# Patient Record
Sex: MALE | Race: WHITE | Employment: OTHER | URBAN - METROPOLITAN AREA
[De-identification: names, ages, dates, MRNs, and addresses within clinical notes are randomized per-mention and may not be internally consistent; named-entity substitution may affect disease eponyms.]

---

## 2017-01-18 PROBLEM — E05.90 HYPERTHYROIDISM: Status: ACTIVE | Noted: 2017-01-18

## 2018-08-29 ENCOUNTER — ANESTHESIA EVENT (OUTPATIENT)
Dept: ENDOSCOPY | Age: 73
End: 2018-08-29
Payer: MEDICARE

## 2018-08-29 NOTE — ANESTHESIA PREPROCEDURE EVALUATION
Anesthetic History No history of anesthetic complications Review of Systems / Medical History Patient summary reviewed, nursing notes reviewed and pertinent labs reviewed Pulmonary Within defined limits Neuro/Psych Within defined limits Cardiovascular Hypertension: well controlled Hyperlipidemia GI/Hepatic/Renal 
  
GERD: well controlled Renal disease: CRI 
PUD Endo/Other Hyperthyroidism: well controlled Cancer Other Findings Physical Exam 
 
Airway Mallampati: II 
TM Distance: > 6 cm Neck ROM: normal range of motion Mouth opening: Normal 
 
 Cardiovascular Regular rate and rhythm,  S1 and S2 normal,  no murmur, click, rub, or gallop Dental 
No notable dental hx Pulmonary Breath sounds clear to auscultation Abdominal 
GI exam deferred Other Findings Anesthetic Plan ASA: 3 Anesthesia type: MAC Induction: Intravenous Anesthetic plan and risks discussed with: Patient

## 2018-08-30 ENCOUNTER — HOSPITAL ENCOUNTER (OUTPATIENT)
Age: 73
Setting detail: OUTPATIENT SURGERY
Discharge: HOME | End: 2018-08-30
Attending: INTERNAL MEDICINE | Admitting: INTERNAL MEDICINE
Payer: MEDICARE

## 2018-08-30 ENCOUNTER — ANESTHESIA (OUTPATIENT)
Dept: ENDOSCOPY | Age: 73
End: 2018-08-30
Payer: MEDICARE

## 2018-08-30 VITALS
BODY MASS INDEX: 23.72 KG/M2 | SYSTOLIC BLOOD PRESSURE: 111 MMHG | HEART RATE: 59 BPM | OXYGEN SATURATION: 98 % | DIASTOLIC BLOOD PRESSURE: 65 MMHG | TEMPERATURE: 98.3 F | WEIGHT: 147.6 LBS | RESPIRATION RATE: 14 BRPM | HEIGHT: 66 IN

## 2018-08-30 PROCEDURE — 76040000019: Performed by: INTERNAL MEDICINE

## 2018-08-30 PROCEDURE — 77030027957 HC TBNG IRR ENDOGTR BUSS -B: Performed by: INTERNAL MEDICINE

## 2018-08-30 PROCEDURE — 74011250636 HC RX REV CODE- 250/636

## 2018-08-30 PROCEDURE — 76060000031 HC ANESTHESIA FIRST 0.5 HR: Performed by: INTERNAL MEDICINE

## 2018-08-30 RX ORDER — FLUMAZENIL 0.1 MG/ML
0.2 INJECTION INTRAVENOUS
Status: DISCONTINUED | OUTPATIENT
Start: 2018-08-30 | End: 2018-08-30 | Stop reason: HOSPADM

## 2018-08-30 RX ORDER — EPINEPHRINE 0.1 MG/ML
1 INJECTION INTRACARDIAC; INTRAVENOUS
Status: DISCONTINUED | OUTPATIENT
Start: 2018-08-30 | End: 2018-08-30 | Stop reason: HOSPADM

## 2018-08-30 RX ORDER — SODIUM CHLORIDE 9 MG/ML
INJECTION, SOLUTION INTRAVENOUS
Status: DISCONTINUED | OUTPATIENT
Start: 2018-08-30 | End: 2018-08-30 | Stop reason: HOSPADM

## 2018-08-30 RX ORDER — DEXTROMETHORPHAN/PSEUDOEPHED 2.5-7.5/.8
1.2 DROPS ORAL
Status: DISCONTINUED | OUTPATIENT
Start: 2018-08-30 | End: 2018-08-30 | Stop reason: HOSPADM

## 2018-08-30 RX ORDER — PROPOFOL 10 MG/ML
INJECTION, EMULSION INTRAVENOUS AS NEEDED
Status: DISCONTINUED | OUTPATIENT
Start: 2018-08-30 | End: 2018-08-30 | Stop reason: HOSPADM

## 2018-08-30 RX ORDER — FENTANYL CITRATE 50 UG/ML
100 INJECTION, SOLUTION INTRAMUSCULAR; INTRAVENOUS
Status: DISCONTINUED | OUTPATIENT
Start: 2018-08-30 | End: 2018-08-30 | Stop reason: HOSPADM

## 2018-08-30 RX ORDER — SODIUM CHLORIDE 0.9 % (FLUSH) 0.9 %
5-10 SYRINGE (ML) INJECTION EVERY 8 HOURS
Status: DISCONTINUED | OUTPATIENT
Start: 2018-08-30 | End: 2018-08-30 | Stop reason: HOSPADM

## 2018-08-30 RX ORDER — ATROPINE SULFATE 0.1 MG/ML
0.5 INJECTION INTRAVENOUS
Status: DISCONTINUED | OUTPATIENT
Start: 2018-08-30 | End: 2018-08-30 | Stop reason: HOSPADM

## 2018-08-30 RX ORDER — SODIUM CHLORIDE 9 MG/ML
50 INJECTION, SOLUTION INTRAVENOUS CONTINUOUS
Status: DISCONTINUED | OUTPATIENT
Start: 2018-08-30 | End: 2018-08-30 | Stop reason: HOSPADM

## 2018-08-30 RX ORDER — SODIUM CHLORIDE 0.9 % (FLUSH) 0.9 %
5-10 SYRINGE (ML) INJECTION AS NEEDED
Status: DISCONTINUED | OUTPATIENT
Start: 2018-08-30 | End: 2018-08-30 | Stop reason: HOSPADM

## 2018-08-30 RX ORDER — MIDAZOLAM HYDROCHLORIDE 1 MG/ML
.25-5 INJECTION, SOLUTION INTRAMUSCULAR; INTRAVENOUS
Status: DISCONTINUED | OUTPATIENT
Start: 2018-08-30 | End: 2018-08-30 | Stop reason: HOSPADM

## 2018-08-30 RX ORDER — NALOXONE HYDROCHLORIDE 0.4 MG/ML
0.4 INJECTION, SOLUTION INTRAMUSCULAR; INTRAVENOUS; SUBCUTANEOUS
Status: DISCONTINUED | OUTPATIENT
Start: 2018-08-30 | End: 2018-08-30 | Stop reason: HOSPADM

## 2018-08-30 RX ADMIN — PROPOFOL 30 MG: 10 INJECTION, EMULSION INTRAVENOUS at 15:11

## 2018-08-30 RX ADMIN — PROPOFOL 30 MG: 10 INJECTION, EMULSION INTRAVENOUS at 15:10

## 2018-08-30 RX ADMIN — PROPOFOL 30 MG: 10 INJECTION, EMULSION INTRAVENOUS at 15:13

## 2018-08-30 RX ADMIN — PROPOFOL 40 MG: 10 INJECTION, EMULSION INTRAVENOUS at 15:06

## 2018-08-30 RX ADMIN — SODIUM CHLORIDE: 9 INJECTION, SOLUTION INTRAVENOUS at 14:55

## 2018-08-30 RX ADMIN — PROPOFOL 80 MG: 10 INJECTION, EMULSION INTRAVENOUS at 15:04

## 2018-08-30 RX ADMIN — PROPOFOL 30 MG: 10 INJECTION, EMULSION INTRAVENOUS at 15:15

## 2018-08-30 RX ADMIN — PROPOFOL 30 MG: 10 INJECTION, EMULSION INTRAVENOUS at 15:12

## 2018-08-30 RX ADMIN — PROPOFOL 30 MG: 10 INJECTION, EMULSION INTRAVENOUS at 15:09

## 2018-08-30 RX ADMIN — PROPOFOL 20 MG: 10 INJECTION, EMULSION INTRAVENOUS at 15:08

## 2018-08-30 NOTE — PROGRESS NOTES

## 2018-08-30 NOTE — H&P
[address] 
611 [facility], 9 [facility] Hodan Poe is a  68 y.o.  male who presents with no symptoms for screening . Past Medical History:  
Diagnosis Date  Achilles tendinitis on left  Allergic rhinitis  Arthritis  Blood transfusion 2006 [address], NO REACTION  
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 Renal Cell Ca.  Chronic kidney disease TUMOR REMOVAL LT. KIDNEY  
 CKD (chronic kidney disease) stage 3, GFR 30-59 ml/min 2/14/2015 S/p Partial nephrecomies for ca. Cr 1.6 in  January 2015  CML in remission ([facility] [address] 8/25/2011  GERD (gastroesophageal reflux disease)  Glaucoma 12/24/2015 2015  Hip dysplasia 10/24/2016 Left groin pain . Left Hip X Ray  : L>R hip dyplasia   
 HTN (hypertension) 8/25/2011  Hypercholesterolemia  Hypertension  Hyperthyroidism 1/18/2017  PUD (peptic ulcer disease) [address] Nasal Fracture  Trauma Concussion x 2 in his youth. 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 Past Surgical History:  
Procedure Laterality Date [address] [place] UNLISTED  ENDOSCOPY, COLON, DIAGNOSTIC    
 HX COLONOSCOPY  3/1/07 Diverticulosis. Dr. Shailesh Bocanegra . follow up in 10 years  HX ENDOSCOPY  11/9/12 reflux esophagitis , gastritis , Dr. Shailesh Bocanegra  HX GI    
 COLONOSCOPY  
 HX HEENT  11/09/2016  
 b/l functional upper Blepharoplasty for Ptosis due to Dermatochalasis  HX HERNIA REPAIR  2006  
 another hernia repair in 1975  HX ORTHOPAEDIC    
 right shoulder arthroscopy  HX OTHER SURGICAL BROKEN NOSE  
 HX PROSTATECTOMY  2006  HX UROLOGICAL    
 LT. KIDNEY TUMOR REMOVAL x2, bilateral hydrocelectomies Allergies Allergen Reactions  Morphine Other (comments) FELT LIKE HE GOT \"HOT\" AND UNCOMFORTABLE  
 Nsaids (Non-Steroidal Anti-Inflammatory Drug) Other (comments) HX OF STOMACH ULCER  Tylenol [Acetaminophen] Other (comments) INTERFERENCE WITH GLEEVEC (LEUKIMIA CML) Current Facility-Administered Medications Medication Dose Route Frequency Provider Last Rate Last Dose  
 0.9% sodium chloride infusion  50 mL/hr IntraVENous CONTINUOUS Estephanie Lindsay MD      
 sodium chloride (NS) flush 5-10 mL  5-10 mL IntraVENous Q8H Estephanie Lindsay MD      
 sodium chloride (NS) flush 5-10 mL  5-10 mL IntraVENous PRN Estephanie Lindsay MD      
 midazolam (VERSED) injection 0.25-5 mg  0.25-5 mg IntraVENous Paulino Lindsay MD      
 fentaNYL citrate (PF) injection 100 mcg  100 mcg IntraVENous Paulino Lindsay MD      
 naloxone [facility]) injection 0.4 mg  0.4 mg IntraVENous Paulino Lindsay MD      
 flumazenil (ROMAZICON) 0.1 mg/mL injection 0.2 mg  0.2 mg IntraVENous Paulino Lindsay MD      
 [facility]) 52BW/1.3WK oral drops 80 mg  1.2 mL Oral Paulino Lindsay MD      
 atropine injection 0.5 mg  0.5 mg IntraVENous ONCE PRN Estephanie Lindsay MD      
 EPINEPHrine (ADRENALIN) 0.1 mg/mL syringe 1 mg  1 mg Endoscopically ONCE PRN Estephanie Lindsay MD      
 
Facility-Administered Medications Ordered in Other Encounters Medication Dose Route Frequency Provider Last Rate Last Dose  
 0.9% sodium chloride infusion   IntraVENous CONTINUOUS Jarvis Cohen CRNA Visit Vitals  /77  Pulse 63  Temp 98.3 °F (36.8 °C)  Resp 11  
 Ht 5' 6\" (1.676 m)  Wt 67 kg (147 lb 9.6 oz)  SpO2 100%  BMI 23.82 kg/m2 PHYSICAL EXAM: 
General: WD, WN. Alert, cooperative, no acute distress   
HEENT: NC, Atraumatic. PERRLA, EOMI. Anicteric sclerae. Mallampati score 2 Lungs:  CTA Bilaterally. No Wheezing/Rhonchi/Rales. Heart:  Regular  rhythm,  No murmur (), No Rubs, No Gallops Abdomen: Soft, Non distended, Non tender.  +Bowel sounds, no HSM Extremities: No c/c/e 
 Neurologic:  CN 2-12 gi, Alert and oriented X 3. No acute neurological distress Psych:   Good insight. Not anxious nor agitated. Plan:  
Endoscopic procedure with MAC.  
 
Jun Chance MD 
8/30/2018  3:04 PM

## 2018-08-30 NOTE — DISCHARGE INSTRUCTIONS
[address]  174 [facility], [id number] [place] [address] Road  [id number]  1945    COLON DISCHARGE INSTRUCTIONS    DISCOMFORT:  Redness at IV site- apply warm compress to area; if redness or soreness persist- contact your physician  There may be a slight amount of blood passed from the rectum  Gaseous discomfort- walking, belching will help relieve any discomfort  You may not operate a vehicle for 12 hours  You may not engage in an occupation involving machinery or appliances for rest of today  You may not drink alcoholic beverages for at least 12 hours  Avoid making any critical decisions for at least 24 hour  DIET:   Regular diet. - however -  remember your colon is empty and a heavy meal will produce gas. Avoid these foods:  vegetables, fried / greasy foods, carbonated drinks for today         ACTIVITY:  You may resume your normal daily activities it is recommended that you spend the remainder of the day resting -  avoid any strenuous activity. CALL M.D. ANY SIGN OF:   Increasing pain, nausea, vomiting  Abdominal distension (swelling)  New increased bleeding (oral or rectal)  Fever (chills)  Pain in chest area  Bloody discharge from nose or mouth  Shortness of breath     Follow-up Instructions:   Call Dr. Levar Vaca for any questions or problems.    Telephone # [phone]    Should have a repeat colonoscopy in 10 years    Impression:  Diverticulosis

## 2018-08-30 NOTE — IP AVS SNAPSHOT
[address] 
[phone] Patient: Hodan Beauchamp. MRN: [id number] :1945 About your hospitalization You were admitted on:  2018 You last received care in the:  [facility] ENDOSCOPY You were discharged on:  2018 Why you were hospitalized Your primary diagnosis was:  Not on File Follow-up Information None Discharge Orders None A check priscila indicates which time of day the medication should be taken. My Medications CONTINUE taking these medications Instructions Each Dose to Equal  
 Morning Noon Evening Bedtime  
 allergy injection Your last dose was: Your next dose is:    
   
   
 by SubCUTAneous route. amLODIPine 2.5 mg tablet Commonly known as:  Wolf Mullet Your last dose was: Your next dose is:    
   
   
 take 1 tablet by mouth once daily  
     
   
   
   
  
 celecoxib 200 mg capsule Commonly known as:  CeleBREX Your last dose was: Your next dose is: Take 1 Cap by mouth daily as needed for Pain. 200 mg  
    
   
   
   
  
 CIALIS 10 mg tablet Generic drug:  tadalafil Your last dose was: Your next dose is: Take 10 mg by mouth. 10 mg  
    
   
   
   
  
 ciprofloxacin HCl 500 mg tablet Commonly known as:  CIPRO Your last dose was: Your next dose is: Take 1 Tab by mouth two (2) times a day. Take for 3 to 5 days for Traveler's Diarrhea or 10 days for a Urinary or Sinus Infection. 500 mg  
    
   
   
   
  
 codeine sulfate 30 mg tablet Your last dose was: Your next dose is: Take 1 Tab by mouth every four (4) hours as needed. 30 mg  
    
   
   
   
  
 FLONASE 50 mcg/actuation nasal spray Generic drug:  fluticasone Your last dose was: Your next dose is:    
   
   
 nightly. folic acid 1 mg tablet Commonly known as:  Google Your last dose was: Your next dose is: Take 1 Tab by mouth daily. 1 mg GLEEVEC 400 mg tablet Generic drug:  imatinib Your last dose was: Your next dose is: Take 400 mg by mouth daily. 400 mg  
    
   
   
   
  
 loperamide 2 mg tablet Commonly known as:  IMMODIUM Your last dose was: Your next dose is: Take  by mouth. 1/2 to 1 tablet up to 4 times daily as needed for diarrhea. methylphenidate HCl 36 mg CR tablet Commonly known as:  CONCERTA Your last dose was: Your next dose is: Take 1 Tab (36 mg total) by mouth every morningEarliest Fill Date: 8/15/18. Max Daily Amount: 36 mg  
 36 mg  
    
   
   
   
  
 metoprolol tartrate 25 mg tablet Commonly known as:  LOPRESSOR Your last dose was: Your next dose is:    
   
   
 take 1 tablet by mouth twice a day * OTHER Your last dose was: Your next dose is:    
   
   
 Glaucoma : one drop OU daily . Dr. Natalie Cassidy 's group * OTHER Your last dose was: Your next dose is:    
   
   
 Omega 6: 4- 5 tabs daily * OTHER Your last dose was: Your next dose is:    
   
   
 Saline Nasal Spray prn  
     
   
   
   
  
 * OTHER Your last dose was: Your next dose is:    
   
   
 Eye drop for eye pressure each night -Dr. Leelee Swan group * OTHER Your last dose was: Your next dose is:    
   
   
 Melatonin 5 mg and Zinc . One qhs  
     
   
   
   
  
 * PANTOPRAZOLE PO Your last dose was: Your next dose is: Take 40 mg by mouth daily.   
 40 mg  
    
   
   
   
  
 * pantoprazole 40 mg tablet Commonly known as:  PROTONIX Your last dose was: Your next dose is:    
   
   
 take 1 tablet by mouth twice a day  
     
   
   
   
  
 pravastatin 10 mg tablet Commonly known as:  PRAVACHOL Your last dose was: Your next dose is:    
   
   
 take 1 tablet by mouth EVERY MONDAY, WEDNESDAY AND FRIDAY  
     
   
   
   
  
 zolpidem 10 mg tablet Commonly known as:  AMBIEN Your last dose was: Your next dose is: Take 0.5 Tabs by mouth nightly as needed. Max Daily Amount: 5 mg.  
 5 mg * Notice: This list has 7 medication(s) that are the same as other medications prescribed for you. Read the directions carefully, and ask your doctor or other care provider to review them with you. Opioid Education Prescription Opioids: What You Need to Know: 
 
Prescription opioids can be used to help relieve moderate-to-severe pain and are often prescribed following a surgery or injury, or for certain health conditions. These medications can be an important part of treatment but also come with serious risks. Opioids are strong pain medicines. Examples include hydrocodone, oxycodone, fentanyl, and morphine. Heroin is an example of an illegal opioid. It is important to work with your health care provider to make sure you are getting the safest, most effective care. WHAT ARE THE RISKS AND SIDE EFFECTS OF OPIOID USE? Prescription opioids carry serious risks of addiction and overdose, especially with prolonged use. An opioid overdose, often marked by slow breathing, can cause sudden death. The use of prescription opioids can have a number of side effects as well, even when taken as directed. · Tolerance-meaning you might need to take more of a medication for the same pain relief · Physical dependence-meaning you have symptoms of withdrawal when the medication is stopped. Withdrawal symptoms can include nausea, sweating, chills, diarrhea, stomach cramps, and muscle aches. Withdrawal can last up to several weeks, depending on which drug you took and how long you took it. · Increased sensitivity to pain · Constipation · Nausea, vomiting, and dry mouth · Sleepiness and dizziness · Confusion · Depression · Low levels of testosterone that can result in lower sex drive, energy, and strength · Itching and sweating RISKS ARE GREATER WITH:      
· History of drug misuse, substance use disorder, or overdose · Mental health conditions (such as depression or anxiety) · Sleep apnea · Older age (72 years or older) · Pregnancy Avoid alcohol while taking prescription opioids. Also, unless specifically advised by your health care provider, medications to avoid include: · Benzodiazepines (such as Xanax or Valium) · Muscle relaxants (such as Soma or Flexeril) · Hypnotics (such as Ambien or Lunesta) · Other prescription opioids KNOW YOUR OPTIONS Talk to your health care provider about ways to manage your pain that don't involve prescription opioids. Some of these options may actually work better and have fewer risks and side effects. Options may include: 
· Pain relievers such as acetaminophen, ibuprofen, and naproxen · Some medications that are also used for depression or seizures · Physical therapy and exercise · Counseling to help patients learn how to cope better with triggers of pain and stress. · Application of heat or cold compress · Massage therapy · Relaxation techniques Be Informed Make sure you know the name of your medication, how much and how often to take it, and its potential risks & side effects. IF YOU ARE PRESCRIBED OPIOIDS FOR PAIN: 
· Never take opioids in greater amounts or more often than prescribed. Remember the goal is not to be pain-free but to manage your pain at a tolerable level. · Follow up with your primary care provider to: · Work together to create a plan on how to manage your pain. · Talk about ways to help manage your pain that don't involve prescription opioids. · Talk about any and all concerns and side effects. · Help prevent misuse and abuse. · Never sell or share prescription opioids · Help prevent misuse and abuse. · Store prescription opioids in a secure place and out of reach of others (this may include visitors, children, friends, and family). · Safely dispose of unused/unwanted prescription opioids: Find your community drug take-back program or your pharmacy mail-back program, or flush them down the toilet, following guidance from the Food and Drug Administration (www.fda.gov/Drugs/ResourcesForYou). · Visit www.cdc.gov/drugoverdose to learn about the risks of opioid abuse and overdose. · If you believe you may be struggling with addiction, tell your health care provider and ask for guidance or call [facility] at [phone]. Discharge Instructions [address] [place] [address] [facility]. 
[id number] 
1945 COLON DISCHARGE INSTRUCTIONS DISCOMFORT: 
Redness at IV site- apply warm compress to area; if redness or soreness persist- contact your physician There may be a slight amount of blood passed from the rectum Gaseous discomfort- walking, belching will help relieve any discomfort You may not operate a vehicle for 12 hours You may not engage in an occupation involving machinery or appliances for rest of today You may not drink alcoholic beverages for at least 12 hours Avoid making any critical decisions for at least 24 hour DIET: 
 Regular diet.  however -  remember your colon is empty and a heavy meal will produce gas. Avoid these foods:  vegetables, fried / greasy foods, carbonated drinks for today ACTIVITY: 
You may resume your normal daily activities it is recommended that you spend the remainder of the day resting -  avoid any strenuous activity. CALL M.D. ANY SIGN OF: Increasing pain, nausea, vomiting Abdominal distension (swelling) New increased bleeding (oral or rectal) Fever (chills) Pain in chest area Bloody discharge from nose or mouth Shortness of breath Follow-up Instructions: 
 Call Dr. Vickie Spears for any questions or problems. Telephone # [phone] Should have a repeat colonoscopy in 10 years Impression:  Diverticulosis Introducing \[facility] [place] [facility]\"" & HEALTH SERVICES! [facility] introduces [employer] patient portal. Now you can access parts of your medical record, email your doctor's office, and request medication refills online. 1. In your internet browser, go to [email or web address] 2. Click on the First Time User? Click Here link in the Sign In box. You will see the New Member Sign Up page. 3. Enter your [employer] Access Code exactly as it appears below. You will not need to use this code after youve completed the sign-up process. If you do not sign up before the expiration date, you must request a new code. · [employer] Access Code: [email or web address] Expires: 11/28/2018  1:17 PM 
 
4. Enter the last four digits of your Social Security Number (xxxx) and Date of Birth (mm/dd/yyyy) as indicated and click Submit. You will be taken to the next sign-up page. 5. Create a [employer] Expresst ID. This will be your [employer] login ID and cannot be changed, so think of one that is secure and easy to remember. 6. Create a [employer] password. You can change your password at any time. 7. Enter your Password Reset Question and Answer. This can be used at a later time if you forget your password. 8. Enter your e-mail address.  You will receive e-mail notification when new information is available in [employer]. 9. Click Sign Up. You can now view and download portions of your medical record. 10. Click the Download Summary menu link to download a portable copy of your medical information. If you have questions, please visit the Frequently Asked Questions section of the [employer] website. Remember, [employer] is NOT to be used for urgent needs. For medical emergencies, dial 911. Now available from your iPhone and Android! Introducing Jose Rivero As a [facility]andra Gue[facility]a patient, I wanted to make you aware of our electronic visit tool called Jose Rivero. [facility] 24/7 allows you to connect within minutes with a medical provider 24 hours a day, seven days a week via a mobile device or tablet or logging into a secure website from your computer. You can access Jose Hoganfainafin from anywhere in the [place]. A virtual visit might be right for you when you have a simple condition and feel like you just dont want to get out of bed, or cant get away from work for an appointment, when your regular [facility]andra Guevara provider is not available (evenings, weekends or holidays), or when youre out of town and need minor care. Electronic visits cost only $49 and if the [facility]andra Guevara 24/7 provider determines a prescription is needed to treat your condition, one can be electronically transmitted to a nearby pharmacy*. Please take a moment to enroll today if you have not already done so. The enrollment process is free and takes just a few minutes. To enroll, please download the Casandra Paola 24/7 jose to your tablet or phone, or visit [email or web address] to enroll on your computer. And, as an [address] [place] [address] patient with a [employer] account, the results of your visits will be scanned into your electronic medical record and your primary care provider will be able to view the scanned results. We urge you to continue to see your regular Bhakti Sanchez provider for your ongoing medical care. And while your primary care provider may not be the one available when you seek a Jose Loweryfin virtual visit, the peace of mind you get from getting a real diagnosis real time can be priceless. For more information on Jose Loweryfin, view our Frequently Asked Questions (FAQs) at [email or web address]. Sincerely, 
 
Sadia Jimenez MD 
Chief Medical Officer [employer] *:  certain medications cannot be prescribed via Jose Edisonfainafin Providers Seen During Your Hospitalization Provider Specialty Primary office phone Luzma Rae MD Gastroenterology [phone] Your Primary Care Physician (PCP) Primary Care Physician Office Phone Office Fax [address], [facility] [phone] You are allergic to the following Allergen Reactions Morphine Other (comments) FELT LIKE HE GOT \"HOT\" AND UNCOMFORTABLE Nsaids (Non-Steroidal Anti-Inflammatory Drug) Other (comments) HX OF STOMACH ULCER Tylenol (Acetaminophen) Other (comments) INTERFERENCE WITH GLEEVEC (LEUKIMIA CML) Recent Documentation Height Weight BMI Smoking Status 1.676 m 67 kg 23.82 kg/m2 Former Smoker Emergency Contacts Name Discharge Info Relation Home Work Mobile ManoharDenice RUFINA DISCHARGE CAREGIVER [3] Spouse [3] [phone] Patient Belongings The following personal items are in your possession at time of discharge: 
  Dental Appliances: None  Visual Aid: Glasses, At bedside Please provide this summary of care documentation to your next provider. Signatures-by signing, you are acknowledging that this After Visit Summary has been reviewed with you and you have received a copy.   
  
 
  
    
    
 Patient Signature: ____________________________________________________________ Date:  ____________________________________________________________  
  
Loren [place] Provider Signature:  ____________________________________________________________ Date:  ____________________________________________________________

## 2018-08-30 NOTE — ANESTHESIA POSTPROCEDURE EVALUATION
Post-Anesthesia Evaluation and Assessment Patient: Fabiana Washington MRN: [id number]  SSN: [id number] YOB: 1945  Age: 68 y.o. Sex: male Cardiovascular Function/Vital Signs Visit Vitals  /65  Pulse (!) 59  Temp 36.8 °C (98.3 °F)  Resp 14  
 Ht 5' 6\" (1.676 m)  Wt 67 kg (147 lb 9.6 oz)  SpO2 98%  BMI 23.82 kg/m2 Patient is status post MAC anesthesia for Procedure(s): 
COLONOSCOPY. Nausea/Vomiting: None Postoperative hydration reviewed and adequate. Pain: 
Pain Scale 1: Visual (08/30/18 1533) Pain Intensity 1: 0 (08/30/18 1533) Managed Neurological Status: At baseline Mental Status and Level of Consciousness: Arousable Pulmonary Status:  
O2 Device: Room air (08/30/18 1530) Adequate oxygenation and airway patent Complications related to anesthesia: None Post-anesthesia assessment completed. No concerns Signed By: Dev Mullen MD   
 August 30, 2018 04-Sep-2017

## 2018-08-30 NOTE — PROCEDURES
[address], [address] [place] [address]         Procedure:  Colonoscopy    :  Shahida Betts MD    Referring Provider: Vitaly Eduardo IV, MD    Sedation:  MAC anesthesia Propofol      Prior to the procedure its objectives, risks, consequences and alternatives were discussed with the patient who then elected to proceed. The patient had the opportunity to ask questions and those questions were answered. A physical exam was performed. The heart, lungs, and mental status were examined prior to the procedure and found to be satisfactory for conscious sedation and for the procedure. Conscious sedation was initiated by the physician. Continuous pulse oximetry and blood pressure monitoring were used throughout the procedure. After appropriate analgesia and rectal exam, the colonoscope was passed into the anus and passed to the cecum without difficulty. The prep was good. On slow withdrawal of the scope, the cecum, ascending, colon, hepatic flexure, transverse colon, splenic flexure and descending colon were normal. In the sigmoid there were mild diverticulosis. The rectum was normal. Retroflexion exam of the rectum was normal He tolerated the procedure without complication and I recommend a repeat colonoscopy in 10 years. Specimen Removed:  None    Complications: None. EBL:  None.         Shahida Betts MD  8/30/2018  3:18 PM

## 2021-10-27 ENCOUNTER — HOSPITAL ENCOUNTER (OUTPATIENT)
Dept: MRI IMAGING | Age: 76
Discharge: HOME | End: 2021-10-27
Attending: INTERNAL MEDICINE
Payer: MEDICARE

## 2021-10-27 DIAGNOSIS — G89.29 CHRONIC RIGHT-SIDED LOW BACK PAIN WITHOUT SCIATICA: ICD-10-CM

## 2021-10-27 DIAGNOSIS — M54.50 CHRONIC RIGHT-SIDED LOW BACK PAIN WITHOUT SCIATICA: ICD-10-CM

## 2021-10-27 PROCEDURE — 72148 MRI LUMBAR SPINE W/O DYE: CPT

## 2022-03-19 PROBLEM — E05.90 HYPERTHYROIDISM: Status: ACTIVE | Noted: 2017-01-18

## 2022-06-16 PROBLEM — N18.30 CHRONIC RENAL DISEASE, STAGE III (HCC): Status: ACTIVE | Noted: 2022-06-16

## 2022-10-25 ENCOUNTER — OFFICE VISIT (OUTPATIENT)
Dept: SURGERY | Age: 77
End: 2022-10-25
Payer: MEDICARE

## 2022-10-25 VITALS
HEART RATE: 85 BPM | WEIGHT: 148.5 LBS | DIASTOLIC BLOOD PRESSURE: 80 MMHG | OXYGEN SATURATION: 97 % | SYSTOLIC BLOOD PRESSURE: 130 MMHG | BODY MASS INDEX: 22.51 KG/M2 | HEIGHT: 68 IN | TEMPERATURE: 97.9 F | RESPIRATION RATE: 20 BRPM

## 2022-10-25 DIAGNOSIS — K40.90 LEFT INGUINAL HERNIA: Primary | ICD-10-CM

## 2022-10-25 PROCEDURE — 3078F DIAST BP <80 MM HG: CPT | Performed by: SURGERY

## 2022-10-25 PROCEDURE — G8754 DIAS BP LESS 90: HCPCS | Performed by: SURGERY

## 2022-10-25 PROCEDURE — G8752 SYS BP LESS 140: HCPCS | Performed by: SURGERY

## 2022-10-25 PROCEDURE — 1123F ACP DISCUSS/DSCN MKR DOCD: CPT | Performed by: SURGERY

## 2022-10-25 PROCEDURE — 3074F SYST BP LT 130 MM HG: CPT | Performed by: SURGERY

## 2022-10-25 PROCEDURE — G8536 NO DOC ELDER MAL SCRN: HCPCS | Performed by: SURGERY

## 2022-10-25 PROCEDURE — 99203 OFFICE O/P NEW LOW 30 MIN: CPT | Performed by: SURGERY

## 2022-10-25 PROCEDURE — G8510 SCR DEP NEG, NO PLAN REQD: HCPCS | Performed by: SURGERY

## 2022-10-25 PROCEDURE — G8427 DOCREV CUR MEDS BY ELIG CLIN: HCPCS | Performed by: SURGERY

## 2022-10-25 PROCEDURE — G8420 CALC BMI NORM PARAMETERS: HCPCS | Performed by: SURGERY

## 2022-10-25 PROCEDURE — 1101F PT FALLS ASSESS-DOCD LE1/YR: CPT | Performed by: SURGERY

## 2022-10-26 NOTE — PROGRESS NOTES
Surgery Consult    Subjective: Tucker Levy is a 69 y.o.  male who was referred for evaluation of left groin lump. Symptoms were first noted several years ago. Symptoms did not start at work. Pain is dull, intermittent. Lump is reducible. Patient does not have symptoms of chronic constipation, chronic cough, difficulty urinating. Patient has a history of previous hernia surgery (open right inguinal hernia repair x2); of note, repair of recurrent hernia performed in setting of open prostatectomy in 2007. He denies nausea, vomiting, fever, chills, dysuria, exertional chest pain, or wheezing. Patient Active Problem List    Diagnosis Date Noted    Left inguinal hernia 10/25/2022    Chronic renal disease, stage III 06/16/2022    Hyperthyroidism 01/18/2017    Hip dysplasia 11/15/2016    Hypercholesterolemia     Glaucoma 12/24/2015    CKD (chronic kidney disease) stage 3, GFR 30-59 ml/min ([facility]) 12/24/2015    CKD (chronic kidney disease) 02/14/2015    ADHD (attention deficit hyperactivity disorder) 02/14/2015    Hyperlipidemia 07/09/2013    Vitamin D deficiency 07/09/2013    Achilles tendinitis on left 10/06/2012    H/O prostate cancer 10/06/2012    HTN (hypertension) 08/25/2011    CML in remission ([facility] [address] 08/25/2011    Renal cell cancer ([facility] [address] 08/25/2011    Shoulder impingement syndrome 02/01/2011    Acromioclavicular joint arthritis 02/01/2011     Past Medical History:   Diagnosis Date    Achilles tendinitis on left     Allergic rhinitis     Arthritis     Blood transfusion 2006    [facility]. [address], NO REACTION    Calculus of kidney     Cancer (HCC)     CML, Prostate Ca. Renal Cell Ca. Chronic kidney disease     TUMOR REMOVAL LT. KIDNEY    CKD (chronic kidney disease) stage 3, GFR 30-59 ml/min ([facility]) 02/14/2015    S/p Partial nephrecomies for ca.  Cr 1.6 in  January 2015    CML in remission [facility]) 08/25/2011    COVID     late Sep 2022    DDD (degenerative disc disease), lumbar     10/26/21: Lumbar MRI : multilevel degenerative change, most significant at L2-L3 and L4-L5     GERD (gastroesophageal reflux disease)     Glaucoma 12/24/2015    2015     Hip dysplasia 10/24/2016    Left groin pain . Left Hip X Ray  : L>R hip dyplasia     HTN (hypertension) 08/25/2011    Hypercholesterolemia     Hypertension     Hyperthyroidism 01/18/2017    PUD (peptic ulcer disease)     Trauma 1967    Nasal Fracture    Trauma     Concussion x 2 in his youth. 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 Zoster      Past Surgical History:   Procedure Laterality Date    COLONOSCOPY N/A 8/30/2018    COLONOSCOPY performed by Justice Toro MD at [facility] ENDOSCOPY. + Diverticulosis     ENDOSCOPY, COLON, DIAGNOSTIC      HX COLONOSCOPY  3/1/07    Diverticulosis. Dr. Janelle Hudson . follow up in 10 years    HX ENDOSCOPY  11/9/12    reflux esophagitis , gastritis , Dr. Keiko Hernandez GI      COLONOSCOPY    HX HEENT  11/09/2016    b/l functional upper Blepharoplasty for Ptosis due to Dermatochalasis    HX HEENT      cataract surgery     HX HERNIA REPAIR  2006    another hernia repair in [address] [place] [address]      right shoulder arthroscopy    HX OTHER SURGICAL      BROKEN NOSE    HX PROSTATECTOMY  2006    HX UROLOGICAL      LT. KIDNEY TUMOR REMOVAL x2, bilateral hydrocelectomies     MS ABDOMEN SURGERY PROC UNLISTED        Family History   Problem Relation Age of Onset    Dementia Mother         Ivana Bismark    Hypertension Mother     Heart Attack Father 55    Hypertension Father     Cancer Brother 62        Non -HODGKINS LYMPHOMA    Stroke Maternal Grandmother     Diabetes Paternal Grandmother     Other Sister 61        Polycythemia.  unclear details       Social History     Tobacco Use    Smoking status: Former     Types: Cigars    Smokeless tobacco: Never    Tobacco comments:     CIGARS FOR 5 YEARS   Substance Use Topics    Alcohol use: Yes     Comment: 10 to 12 drinks per week       Current Outpatient Medications   Medication Sig nirmatrelvir-ritonavir (PAXLOVID) 300 mg (150 mg x 2)-100 mg 3 pills by mouth twice daily for  5 days    pravastatin (PRAVACHOL) 10 mg tablet TAKE 1 TABLET BY MOUTH EVERY MONDAY, WEDNESDAY AND FRIDAY    zolpidem (AMBIEN) 10 mg tablet TAKE 1/2 TABLET AT BEDTIME AS NEEDED    amLODIPine (NORVASC) 5 mg tablet TAKE 1 TABLET BY MOUTH ONCE DAILY    metoprolol tartrate (LOPRESSOR) 25 mg tablet TAKE 1 TABLET BY MOUTH TWICE DAILY    pantoprazole (PROTONIX) 40 mg tablet TAKE 1 TABLET BY MOUTH ONCE DAILY    penciclovir (DENAVIR) 1 % topical cream Apply  to affected area every two (2) hours. magnesium 250 mg tab Take  by mouth daily as needed (leg cramps). codeine sulfate 30 mg tablet Take one tablet q 6 hrs,prn    loperamide (IMMODIUM) 2 mg tablet Take  by mouth. 1/2 to 1 tablet up to 4 times daily as needed for diarrhea. OTHER Melatonin 5 mg and Zinc . One qhs    OTHER Omega 6: 4- 5 tabs daily. Pre biotic 1-2 tabs daily    OTHER Saline Nasal Spray prn    OTHER Eye drop for eye pressure each night -Dr. Medina [facility] group      OTHER Glaucoma : one drop OU daily . Dr. Theresa Murray 's group    fluticasone propionate (FLONASE) 50 mcg/actuation nasal spray nightly. allergy injection by SubCUTAneous route. imatinib (GLEEVEC) 400 mg tablet Take 400 mg by mouth daily. methylphenidate ER 36 mg 24 hr tab Take one pill daily    predniSONE (DELTASONE) 10 mg tablet Take 10 mg by mouth daily as needed for Pain. (Patient not taking: Reported on 10/25/2022)    azithromycin (ZITHROMAX) 250 mg tablet 2 pills day 1. Then 1 pill daily (Patient not taking: Reported on 10/25/2022)    ciprofloxacin HCl (CIPRO) 500 mg tablet Take 1 Tab by mouth two (2) times a day. Take for 3 to 5 days for Traveler's Diarrhea or 10 days for a Urinary or Sinus Infection. (Patient not taking: Reported on 10/25/2022)    tadalafiL (CIALIS) 10 mg tablet Take 10 mg by mouth. No current facility-administered medications for this visit.       Allergies Allergen Reactions    Morphine Other (comments)     FELT LIKE HE GOT \"HOT\" AND UNCOMFORTABLE    Nsaids (Non-Steroidal Anti-Inflammatory Drug) Other (comments)     HX OF STOMACH ULCER    Tylenol [Acetaminophen] Other (comments)     INTERFERENCE WITH GLEEVEC (LEUKIMIA CML)        Review of Systems:  A complete review of systems was negative except as noted in the HPI. Objective:     Visit Vitals  /80 (BP 1 Location: Left upper arm, BP Patient Position: Sitting, BP Cuff Size: Large adult)   Pulse 85   Temp 97.9 °F (36.6 °C)   Resp 20   Ht 5' 8\" (1.727 m)   Wt 148 lb 8 oz (67.4 kg)   SpO2 97%   BMI 22.58 kg/m²       Physical Exam:  GENERAL: alert, cooperative, no distress, appears stated age, EYE: negative, LYMPHATIC: No cervical or supraclavicular adenopathy. THROAT & NECK: normal, LUNG: clear to auscultation bilaterally, HEART: regular rate and rhythm, S1, S2 normal, no murmur. ABDOMEN: NABS, nondistended, soft. No ventral hernia. Tender, reducible left inguinal hernia. No right inguinal hernia. EXTREMITIES:  extremities normal, atraumatic, no cyanosis or edema, SKIN: Normal., NEUROLOGIC: negative    Assessment:     Left inguinal hernia, reducible, but tender to palpation. In the setting of prior open prostatectomy, I recommended open left inguinal hernia repair with mesh as safest option. Technical aspects of procedure reviewed along with risks (to include but not limited to bleeding, wound infection, recurrence, groin pain). Also reviewed the outpatient nature of the procedure, postoperative activity restrictions, and expected results. He desires to proceed with this procedure later this year. Plan:     1. We will proceed with scheduling for open left inguinal hernia repair with mesh. 2.  Avoid constipation; recommended addition of MiraLAX to daily medications. 3.  Avoid strenuous activity; physical findings for incarceration reviewed with patient.

## 2022-11-03 PROBLEM — M51.9 LUMBAR DISC DISEASE: Status: ACTIVE | Noted: 2022-11-03

## 2022-12-16 ENCOUNTER — HOSPITAL ENCOUNTER (OUTPATIENT)
Dept: PREADMISSION TESTING | Age: 77
End: 2022-12-16
Payer: MEDICARE

## 2022-12-16 ENCOUNTER — HOSPITAL ENCOUNTER (OUTPATIENT)
Dept: GENERAL RADIOLOGY | Age: 77
Discharge: HOME | End: 2022-12-16
Attending: SURGERY
Payer: MEDICARE

## 2022-12-16 VITALS
SYSTOLIC BLOOD PRESSURE: 161 MMHG | WEIGHT: 147.05 LBS | DIASTOLIC BLOOD PRESSURE: 73 MMHG | BODY MASS INDEX: 23.63 KG/M2 | HEIGHT: 66 IN | OXYGEN SATURATION: 98 % | HEART RATE: 56 BPM | TEMPERATURE: 97.9 F

## 2022-12-16 LAB
ALBUMIN SERPL-MCNC: 3.4 G/DL (ref 3.5–5)
ALBUMIN/GLOB SERPL: 1.2 {RATIO} (ref 1.1–2.2)
ALP SERPL-CCNC: 58 U/L (ref 45–117)
ALT SERPL-CCNC: 20 U/L (ref 12–78)
ANION GAP SERPL CALC-SCNC: 4 MMOL/L (ref 5–15)
APPEARANCE UR: CLEAR
AST SERPL-CCNC: 24 U/L (ref 15–37)
BACTERIA URNS QL MICRO: NEGATIVE /HPF
BASOPHILS # BLD: 0.1 K/UL (ref 0–0.1)
BASOPHILS NFR BLD: 1 % (ref 0–1)
BILIRUB SERPL-MCNC: 0.3 MG/DL (ref 0.2–1)
BILIRUB UR QL: NEGATIVE
BUN SERPL-MCNC: 21 MG/DL (ref 6–20)
BUN/CREAT SERPL: 13 (ref 12–20)
CALCIUM SERPL-MCNC: 8.7 MG/DL (ref 8.5–10.1)
CHLORIDE SERPL-SCNC: 103 MMOL/L (ref 97–108)
CO2 SERPL-SCNC: 28 MMOL/L (ref 21–32)
COLOR UR: NORMAL
CREAT SERPL-MCNC: 1.58 MG/DL (ref 0.7–1.3)
DIFFERENTIAL METHOD BLD: ABNORMAL
EOSINOPHIL # BLD: 0.2 K/UL (ref 0–0.4)
EOSINOPHIL NFR BLD: 4 % (ref 0–7)
EPITH CASTS URNS QL MICRO: NORMAL /LPF
ERYTHROCYTE [DISTWIDTH] IN BLOOD BY AUTOMATED COUNT: 14.4 % (ref 11.5–14.5)
GLOBULIN SER CALC-MCNC: 2.9 G/DL (ref 2–4)
GLUCOSE SERPL-MCNC: 110 MG/DL (ref 65–100)
GLUCOSE UR STRIP.AUTO-MCNC: NEGATIVE MG/DL
HCT VFR BLD AUTO: 33.8 % (ref 36.6–50.3)
HGB BLD-MCNC: 11.5 G/DL (ref 12.1–17)
HGB UR QL STRIP: NEGATIVE
HYALINE CASTS URNS QL MICRO: NORMAL /LPF (ref 0–5)
IMM GRANULOCYTES # BLD AUTO: 0 K/UL (ref 0–0.04)
IMM GRANULOCYTES NFR BLD AUTO: 1 % (ref 0–0.5)
KETONES UR QL STRIP.AUTO: NEGATIVE MG/DL
LEUKOCYTE ESTERASE UR QL STRIP.AUTO: NEGATIVE
LYMPHOCYTES # BLD: 1 K/UL (ref 0.8–3.5)
LYMPHOCYTES NFR BLD: 24 % (ref 12–49)
MAGNESIUM SERPL-MCNC: 2.1 MG/DL (ref 1.6–2.4)
MCH RBC QN AUTO: 31.7 PG (ref 26–34)
MCHC RBC AUTO-ENTMCNC: 34 G/DL (ref 30–36.5)
MCV RBC AUTO: 93.1 FL (ref 80–99)
MONOCYTES # BLD: 0.4 K/UL (ref 0–1)
MONOCYTES NFR BLD: 10 % (ref 5–13)
NEUTS SEG # BLD: 2.6 K/UL (ref 1.8–8)
NEUTS SEG NFR BLD: 60 % (ref 32–75)
NITRITE UR QL STRIP.AUTO: NEGATIVE
NRBC # BLD: 0 K/UL (ref 0–0.01)
NRBC BLD-RTO: 0 PER 100 WBC
PH UR STRIP: 7 [PH] (ref 5–8)
PLATELET # BLD AUTO: 203 K/UL (ref 150–400)
PMV BLD AUTO: 11.2 FL (ref 8.9–12.9)
POTASSIUM SERPL-SCNC: 4.7 MMOL/L (ref 3.5–5.1)
PROT SERPL-MCNC: 6.3 G/DL (ref 6.4–8.2)
PROT UR STRIP-MCNC: NEGATIVE MG/DL
RBC # BLD AUTO: 3.63 M/UL (ref 4.1–5.7)
RBC #/AREA URNS HPF: NORMAL /HPF (ref 0–5)
SODIUM SERPL-SCNC: 135 MMOL/L (ref 136–145)
SP GR UR REFRACTOMETRY: 1.01 (ref 1–1.03)
UA: UC IF INDICATED,UAUC: NORMAL
UROBILINOGEN UR QL STRIP.AUTO: 0.2 EU/DL (ref 0.2–1)
WBC # BLD AUTO: 4.2 K/UL (ref 4.1–11.1)
WBC URNS QL MICRO: NORMAL /HPF (ref 0–4)

## 2022-12-16 PROCEDURE — 80053 COMPREHEN METABOLIC PANEL: CPT

## 2022-12-16 PROCEDURE — 36415 COLL VENOUS BLD VENIPUNCTURE: CPT

## 2022-12-16 PROCEDURE — 85025 COMPLETE CBC W/AUTO DIFF WBC: CPT

## 2022-12-16 PROCEDURE — 71046 X-RAY EXAM CHEST 2 VIEWS: CPT

## 2022-12-16 PROCEDURE — 83735 ASSAY OF MAGNESIUM: CPT

## 2022-12-16 PROCEDURE — 81001 URINALYSIS AUTO W/SCOPE: CPT

## 2022-12-16 RX ORDER — MELATONIN
1000 DAILY
COMMUNITY

## 2022-12-16 RX ORDER — CODEINE SULFATE 15 MG/1
TABLET ORAL
COMMUNITY

## 2022-12-16 NOTE — PERIOP NOTES
[address] INSTRUCTIONS    Surgery Date:   1/5/23    Your surgeon's office or [facility] staff will call you between 4 PM- 8 PM the day before surgery with your arrival time. If your surgery is on a Monday, you will receive a call the preceding Friday. Please report to [facility] Patient Access/Admitting on the 1st floor. Bring your insurance card, photo identification, and any copayment ( if applicable). If you are going home the same day of your surgery, you must have a responsible adult to drive you home. You need to have a responsible adult to stay with you the first 24 hours after surgery and you should not drive a car for 24 hours following your surgery. Do NOT eat any solid foods after midnight the night before surgery including candy, mint or gum. You may drink clear liquids from midnight until 1 hour prior to your arrival. You may drink up to 12 ounces at one time every 4 hours. Please note special instructions, if applicable, below for medications. Do NOT drink alcohol or smoke 24 hours before surgery. STOP smoking for 14 days prior as it helps with breathing and healing after surgery. If you are being admitted to the hospital, please leave personal belongings/luggage in your car until you have an assigned hospital room number. Please wear comfortable clothes. Wear your glasses instead of contacts. We ask that all money, jewelry and valuables be left at home. Wear no make up, particularly mascara, the day of surgery. All body piercings, rings, and jewelry need to be removed and left at home. Please remove any nail polish or artifical nails from your fingernails. Please wear your hair loose or down. Please no pony-tails, buns, or any metal hair accessories. If you shower the morning of surgery, please do not apply any lotions or powders afterwards. You may wear deodorant, unless having breast surgery. Do not shave any body area within 24 hours of your surgery.   Please follow all instructions to avoid any potential surgical cancellation. Should your physical condition change, (i.e. fever, cold, flu, etc.) please notify your surgeon as soon as possible. It is important to be on time. If a situation occurs where you may be delayed, please call:  [phone] / [phone] on the day of surgery. The Preadmission Testing staff can be reached at [phone]. Special instructions: NONE      Current Outpatient Medications   Medication Sig    cholecalciferol (Vitamin D3) (1000 Units /25 mcg) tablet Take 1,000 Units by mouth daily. L.acid,casei,plant,saliv/B.ani (PROBIOTIC FORMULA PO) Take  by mouth.    codeine sulfate 15 mg tablet Take  by mouth every four (4) hours as needed for Pain. UNSURE OF DOSE    methylphenidate ER 36 mg 24 hr tab Take one pill daily    metoprolol tartrate (LOPRESSOR) 25 mg tablet TAKE 1 TABLET BY MOUTH TWICE A DAY    zolpidem (AMBIEN) 10 mg tablet TAKE 1/2 TABLET AT BEDTIME AS NEEDED    pravastatin (PRAVACHOL) 10 mg tablet TAKE 1 TABLET BY MOUTH EVERY MONDAY, WEDNESDAY AND FRIDAY    amLODIPine (NORVASC) 5 mg tablet TAKE 1 TABLET BY MOUTH ONCE DAILY    pantoprazole (PROTONIX) 40 mg tablet TAKE 1 TABLET BY MOUTH ONCE DAILY    penciclovir (DENAVIR) 1 % topical cream Apply  to affected area every two (2) hours. magnesium 250 mg tab Take  by mouth daily as needed (leg cramps). loperamide (IMMODIUM) 2 mg tablet Take  by mouth. 1/2 to 1 tablet up to 4 times daily as needed for diarrhea. OTHER Melatonin 5 mg and Zinc . One qhs    tadalafiL (CIALIS) 10 mg tablet Take 5 mg by mouth. OTHER Eye drop for eye pressure each night -Dr. Lomax Milder group      fluticasone propionate (FLONASE) 50 mcg/actuation nasal spray nightly. allergy injection by SubCUTAneous route. imatinib (GLEEVEC) 400 mg tablet Take 400 mg by mouth daily (with lunch). OTHER Saline Nasal Spray prn    OTHER Glaucoma : one drop OU daily .  Dr. Maris Faust 's group     No current facility-administered medications for this encounter. YOU MUST ONLY TAKE THESE MEDICATIONS THE MORNING OF SURGERY WITH A SIP OF WATER: METOPROLOL, AMLODIPINE, PANTOPRAZOLE  MEDICATIONS TO TAKE THE MORNING OF SURGERY ONLY IF NEEDED: NONE  HOLD these prescription medications BEFORE Surgery: TADALAFIL (STOP ON 1/4/22)  Ask your surgeon/prescribing physician about when/if to STOP taking these medications: GLEEVEC  Stop all vitamins, herbal medicines and Aspirin containing products 7 days prior to surgery. Stop any non-steroidal anti-inflammatory drugs (i.e. Ibuprofen, Naproxen, Advil, Aleve) 3 days before surgery. You may take Tylenol. If you are currently taking Plavix, Coumadin,or any other blood-thinning/anticoagulant medication contact your prescribing physician for instructions. Eating and Drinking Before Surgery    You may eat a regular dinner at the usual time on the day before your surgery. Do NOT eat any solid foods after midnight unless your arrival time at the hospital is 3pm or later. You may drink clear liquids only from 12 midnight until 1 hours prior to your arrival time at the hospital on the day of your surgery. Do NOT drink alcohol. Clear liquids include:  Water  Fruit juices without pulp( i.e. apple juice)  Carbonated beverages  Black coffee (no cream/milk)  Tea (no cream/milk)  Gatorade  You may drink up to 12-16 ounces at one time every 4 hours between the hours of midnight and 1 hour before your arrival time at the hospital. Example- if your arrival time at the hospital is 6am, you may drink 12-16 ounces of clear liquids no later than 5am.  If your arrival time at the hospital is 3pm or later, you may eat a light breakfast before 8am.  A light breakfast includes:   Toast or bagel (no butter)  Black coffee (no cream/milk)  Tea (no cream/milk)  Fruit juices without pulp ( i.e. apple juice)  Do NOT eat meat, eggs, vegetables or fruit  If you have any questions, please contact your surgeon's office. Preventing Infections Before and After - Your Surgery    IMPORTANT INSTRUCTIONS    You play an important role in your health and preparation for surgery. To reduce the germs on your skin you will need to shower with CHG soap (Chorhexidine gluconate 4%) two times before surgery. CHG soap (Hibiclens, Hex-A-Clens or store brand)  CHG soap will be provided at your Preadmission Testing (PAT) appointment. If you do not have a PAT appointment before surgery, you may arrange to  CHG soap from our office or purchase CHG soap at a pharmacy, grocery or department store. You need to purchase TWO 4 ounce bottles to use for your 2 showers. Steps to follow:  Fredrich Pen your hair with your normal shampoo and your body with regular soap and rinse well to remove shampoo and soap from your skin. Wet a clean washcloth and turn off the shower. Put CHG soap on washcloth and apply to your entire body from the neck down. Do not use on your head, face or private parts(genitals). Do not use CHG soap on open sores, wounds or areas of skin irritation. Wash you body gently for 5 minutes. Do not wash your skin too hard. This soap does not create lather. Pay special attention to your underarms and from your belly button to your feet. Turn the shower back on and rinse well to get CHG soap off your body. Pat your skin dry with a clean, dry towel. Do not apply lotions or moisturizer. Put on clean clothes and sleep on fresh bed sheets and do not allow pets to sleep with you. Shower with CHG soap 2 times before your surgery  The evening before your surgery  The morning of your surgery      Tips to help prevent infections after your surgery:  Protect your surgical wound from germs:  Hand washing is the most important thing you and your caregivers can do to prevent infections. Keep your bandage clean and dry! Do not touch your surgical wound.   Use clean, freshly washed towels and washcloths every time you shower; do not share bath linens with others. Until your surgical wound is healed, wear clothing and sleep on bed linens each day that are clean and freshly washed. Do not allow pets to sleep in your bed with you or touch your surgical wound. Do not smoke - smoking delays wound healing. This may be a good time to stop smoking. If you have diabetes, it is important for you to manage your blood sugar levels properly before your surgery as well as after your surgery. Poorly managed blood sugar levels slow down wound healing and prevent you from healing completely. Patient Information Regarding COVID Restrictions      Day of Procedure    Please park in the parking deck or any designated visitor parking lot. Enter the facility through the Main Entrance of the hospital.  On the day of surgery, please provide the cell phone number of the person who will be waiting for you to the Patient Access representative at the time of registration. Masks are highly recommended in the hospital, but not required. Once your procedure and the immediate recovery period is completed, a nurse in the recovery area will contact your designated visitor to inform them of your room number or to otherwise review other pertinent information regarding your care. Social distancing practices are strongly encouraged in waiting areas and the cafeteria. The patient was contacted  in person. He verbalized understanding of all instructions and does not  need reinforcement.

## 2022-12-16 NOTE — PERIOP NOTES
Preoperative instructions reviewed with patient. Patient given [facility] infection FAQS sheet. Given two bottles of skin prep chlorhexidine soap; given written and verbal instructions on use. Patient was given the opportunity to ask questions on the information provided. Patient instructed to obtain chest X-ray at [address] [place] [address] upon leaving PAT department.

## 2023-01-04 ENCOUNTER — ANESTHESIA EVENT (OUTPATIENT)
Dept: SURGERY | Age: 78
End: 2023-01-04
Payer: MEDICARE

## 2023-01-05 ENCOUNTER — HOSPITAL ENCOUNTER (OUTPATIENT)
Age: 78
Setting detail: OUTPATIENT SURGERY
Discharge: HOME | End: 2023-01-05
Attending: SURGERY | Admitting: SURGERY
Payer: MEDICARE

## 2023-01-05 ENCOUNTER — ANESTHESIA (OUTPATIENT)
Dept: SURGERY | Age: 78
End: 2023-01-05
Payer: MEDICARE

## 2023-01-05 VITALS
WEIGHT: 147 LBS | TEMPERATURE: 97.5 F | RESPIRATION RATE: 16 BRPM | OXYGEN SATURATION: 97 % | HEIGHT: 66 IN | DIASTOLIC BLOOD PRESSURE: 77 MMHG | SYSTOLIC BLOOD PRESSURE: 161 MMHG | BODY MASS INDEX: 23.63 KG/M2 | HEART RATE: 55 BPM

## 2023-01-05 DIAGNOSIS — Z98.890 S/P INGUINAL HERNIA REPAIR: Primary | ICD-10-CM

## 2023-01-05 DIAGNOSIS — K40.90 LEFT INGUINAL HERNIA: ICD-10-CM

## 2023-01-05 DIAGNOSIS — Z87.19 S/P INGUINAL HERNIA REPAIR: Primary | ICD-10-CM

## 2023-01-05 PROCEDURE — 74011250636 HC RX REV CODE- 250/636: Performed by: ANESTHESIOLOGY

## 2023-01-05 PROCEDURE — 49505 PRP I/HERN INIT REDUC >5 YR: CPT | Performed by: SURGERY

## 2023-01-05 PROCEDURE — 76210000006 HC OR PH I REC 0.5 TO 1 HR: Performed by: SURGERY

## 2023-01-05 PROCEDURE — 74011000250 HC RX REV CODE- 250: Performed by: SURGERY

## 2023-01-05 PROCEDURE — 77030002986 HC SUT PROL J&J -A: Performed by: SURGERY

## 2023-01-05 PROCEDURE — 77030040503 HC DRN WND PENRS MDII -A: Performed by: SURGERY

## 2023-01-05 PROCEDURE — 74011250636 HC RX REV CODE- 250/636: Performed by: SURGERY

## 2023-01-05 PROCEDURE — 77030026438 HC STYL ET INTUB CARD -A: Performed by: ANESTHESIOLOGY

## 2023-01-05 PROCEDURE — C1781 MESH (IMPLANTABLE): HCPCS | Performed by: SURGERY

## 2023-01-05 PROCEDURE — 76210000021 HC REC RM PH II 0.5 TO 1 HR: Performed by: SURGERY

## 2023-01-05 PROCEDURE — 77030008684 HC TU ET CUF COVD -B: Performed by: ANESTHESIOLOGY

## 2023-01-05 PROCEDURE — 77030010507 HC ADH SKN DERMBND J&J -B: Performed by: SURGERY

## 2023-01-05 PROCEDURE — 77030002933 HC SUT MCRYL J&J -A: Performed by: SURGERY

## 2023-01-05 PROCEDURE — 76060000034 HC ANESTHESIA 1.5 TO 2 HR: Performed by: SURGERY

## 2023-01-05 PROCEDURE — 77030040361 HC SLV COMPR DVT MDII -B: Performed by: SURGERY

## 2023-01-05 PROCEDURE — 74011250636 HC RX REV CODE- 250/636: Performed by: STUDENT IN AN ORGANIZED HEALTH CARE EDUCATION/TRAINING PROGRAM

## 2023-01-05 PROCEDURE — 77030031139 HC SUT VCRL2 J&J -A: Performed by: SURGERY

## 2023-01-05 PROCEDURE — 77030042556 HC PNCL CAUT -B: Performed by: SURGERY

## 2023-01-05 PROCEDURE — 74011000250 HC RX REV CODE- 250: Performed by: STUDENT IN AN ORGANIZED HEALTH CARE EDUCATION/TRAINING PROGRAM

## 2023-01-05 PROCEDURE — 76010000149 HC OR TIME 1 TO 1.5 HR: Performed by: SURGERY

## 2023-01-05 PROCEDURE — 2709999900 HC NON-CHARGEABLE SUPPLY: Performed by: SURGERY

## 2023-01-05 DEVICE — MESH HERN W8XL12CM L INGUINAL POLY HYDRPHLC MFIL SELF: Type: IMPLANTABLE DEVICE | Site: ABDOMEN | Status: FUNCTIONAL

## 2023-01-05 RX ORDER — SUCCINYLCHOLINE CHLORIDE 20 MG/ML
INJECTION INTRAMUSCULAR; INTRAVENOUS AS NEEDED
Status: DISCONTINUED | OUTPATIENT
Start: 2023-01-05 | End: 2023-01-05 | Stop reason: HOSPADM

## 2023-01-05 RX ORDER — DIPHENHYDRAMINE HYDROCHLORIDE 50 MG/ML
12.5 INJECTION, SOLUTION INTRAMUSCULAR; INTRAVENOUS AS NEEDED
Status: DISCONTINUED | OUTPATIENT
Start: 2023-01-05 | End: 2023-01-05 | Stop reason: HOSPADM

## 2023-01-05 RX ORDER — FENTANYL CITRATE 50 UG/ML
50 INJECTION, SOLUTION INTRAMUSCULAR; INTRAVENOUS AS NEEDED
Status: DISCONTINUED | OUTPATIENT
Start: 2023-01-05 | End: 2023-01-05 | Stop reason: HOSPADM

## 2023-01-05 RX ORDER — SODIUM CHLORIDE 9 MG/ML
25 INJECTION, SOLUTION INTRAVENOUS CONTINUOUS
Status: DISCONTINUED | OUTPATIENT
Start: 2023-01-05 | End: 2023-01-05 | Stop reason: HOSPADM

## 2023-01-05 RX ORDER — OXYCODONE HYDROCHLORIDE 5 MG/1
5 TABLET ORAL
Qty: 10 TABLET | Refills: 0 | Status: SHIPPED | OUTPATIENT
Start: 2023-01-05 | End: 2023-01-08

## 2023-01-05 RX ORDER — MIDAZOLAM HYDROCHLORIDE 1 MG/ML
0.5 INJECTION, SOLUTION INTRAMUSCULAR; INTRAVENOUS
Status: DISCONTINUED | OUTPATIENT
Start: 2023-01-05 | End: 2023-01-05 | Stop reason: HOSPADM

## 2023-01-05 RX ORDER — ROCURONIUM BROMIDE 10 MG/ML
INJECTION, SOLUTION INTRAVENOUS AS NEEDED
Status: DISCONTINUED | OUTPATIENT
Start: 2023-01-05 | End: 2023-01-05 | Stop reason: HOSPADM

## 2023-01-05 RX ORDER — ONDANSETRON 2 MG/ML
4 INJECTION INTRAMUSCULAR; INTRAVENOUS AS NEEDED
Status: DISCONTINUED | OUTPATIENT
Start: 2023-01-05 | End: 2023-01-05 | Stop reason: HOSPADM

## 2023-01-05 RX ORDER — BUPIVACAINE HYDROCHLORIDE 5 MG/ML
50 INJECTION, SOLUTION EPIDURAL; INTRACAUDAL ONCE
Status: DISCONTINUED | OUTPATIENT
Start: 2023-01-05 | End: 2023-01-05 | Stop reason: HOSPADM

## 2023-01-05 RX ORDER — ALBUTEROL SULFATE 0.83 MG/ML
2.5 SOLUTION RESPIRATORY (INHALATION) AS NEEDED
Status: DISCONTINUED | OUTPATIENT
Start: 2023-01-05 | End: 2023-01-05 | Stop reason: HOSPADM

## 2023-01-05 RX ORDER — FENTANYL CITRATE 50 UG/ML
25 INJECTION, SOLUTION INTRAMUSCULAR; INTRAVENOUS
Status: DISCONTINUED | OUTPATIENT
Start: 2023-01-05 | End: 2023-01-05 | Stop reason: HOSPADM

## 2023-01-05 RX ORDER — GLYCOPYRROLATE 0.2 MG/ML
0.2 INJECTION INTRAMUSCULAR; INTRAVENOUS
Status: DISCONTINUED | OUTPATIENT
Start: 2023-01-05 | End: 2023-01-05 | Stop reason: HOSPADM

## 2023-01-05 RX ORDER — LIDOCAINE HYDROCHLORIDE 20 MG/ML
INJECTION, SOLUTION EPIDURAL; INFILTRATION; INTRACAUDAL; PERINEURAL AS NEEDED
Status: DISCONTINUED | OUTPATIENT
Start: 2023-01-05 | End: 2023-01-05 | Stop reason: HOSPADM

## 2023-01-05 RX ORDER — KETAMINE HYDROCHLORIDE 10 MG/ML
INJECTION, SOLUTION INTRAMUSCULAR; INTRAVENOUS AS NEEDED
Status: DISCONTINUED | OUTPATIENT
Start: 2023-01-05 | End: 2023-01-05 | Stop reason: HOSPADM

## 2023-01-05 RX ORDER — BUPIVACAINE HYDROCHLORIDE 5 MG/ML
50 INJECTION, SOLUTION EPIDURAL; INTRACAUDAL ONCE
Status: COMPLETED | OUTPATIENT
Start: 2023-01-05 | End: 2023-01-05

## 2023-01-05 RX ORDER — SODIUM CHLORIDE, SODIUM LACTATE, POTASSIUM CHLORIDE, CALCIUM CHLORIDE 600; 310; 30; 20 MG/100ML; MG/100ML; MG/100ML; MG/100ML
1000 INJECTION, SOLUTION INTRAVENOUS CONTINUOUS
Status: DISCONTINUED | OUTPATIENT
Start: 2023-01-05 | End: 2023-01-05 | Stop reason: HOSPADM

## 2023-01-05 RX ORDER — NEOSTIGMINE METHYLSULFATE 1 MG/ML
INJECTION, SOLUTION INTRAVENOUS AS NEEDED
Status: DISCONTINUED | OUTPATIENT
Start: 2023-01-05 | End: 2023-01-05 | Stop reason: HOSPADM

## 2023-01-05 RX ORDER — ONDANSETRON 2 MG/ML
INJECTION INTRAMUSCULAR; INTRAVENOUS AS NEEDED
Status: DISCONTINUED | OUTPATIENT
Start: 2023-01-05 | End: 2023-01-05 | Stop reason: HOSPADM

## 2023-01-05 RX ORDER — MIDAZOLAM HYDROCHLORIDE 1 MG/ML
1 INJECTION, SOLUTION INTRAMUSCULAR; INTRAVENOUS AS NEEDED
Status: DISCONTINUED | OUTPATIENT
Start: 2023-01-05 | End: 2023-01-05 | Stop reason: HOSPADM

## 2023-01-05 RX ORDER — PROPOFOL 10 MG/ML
INJECTION, EMULSION INTRAVENOUS AS NEEDED
Status: DISCONTINUED | OUTPATIENT
Start: 2023-01-05 | End: 2023-01-05 | Stop reason: HOSPADM

## 2023-01-05 RX ORDER — SODIUM CHLORIDE, SODIUM LACTATE, POTASSIUM CHLORIDE, CALCIUM CHLORIDE 600; 310; 30; 20 MG/100ML; MG/100ML; MG/100ML; MG/100ML
INJECTION, SOLUTION INTRAVENOUS
Status: DISCONTINUED | OUTPATIENT
Start: 2023-01-05 | End: 2023-01-05 | Stop reason: HOSPADM

## 2023-01-05 RX ORDER — ROPIVACAINE HYDROCHLORIDE 5 MG/ML
30 INJECTION, SOLUTION EPIDURAL; INFILTRATION; PERINEURAL AS NEEDED
Status: DISCONTINUED | OUTPATIENT
Start: 2023-01-05 | End: 2023-01-05 | Stop reason: HOSPADM

## 2023-01-05 RX ORDER — GLYCOPYRROLATE 0.2 MG/ML
INJECTION INTRAMUSCULAR; INTRAVENOUS AS NEEDED
Status: DISCONTINUED | OUTPATIENT
Start: 2023-01-05 | End: 2023-01-05 | Stop reason: HOSPADM

## 2023-01-05 RX ORDER — DEXAMETHASONE SODIUM PHOSPHATE 4 MG/ML
INJECTION, SOLUTION INTRA-ARTICULAR; INTRALESIONAL; INTRAMUSCULAR; INTRAVENOUS; SOFT TISSUE AS NEEDED
Status: DISCONTINUED | OUTPATIENT
Start: 2023-01-05 | End: 2023-01-05 | Stop reason: HOSPADM

## 2023-01-05 RX ORDER — HYDROMORPHONE HYDROCHLORIDE 1 MG/ML
0.2 INJECTION, SOLUTION INTRAMUSCULAR; INTRAVENOUS; SUBCUTANEOUS
Status: DISCONTINUED | OUTPATIENT
Start: 2023-01-05 | End: 2023-01-05 | Stop reason: HOSPADM

## 2023-01-05 RX ORDER — LIDOCAINE HYDROCHLORIDE 10 MG/ML
0.1 INJECTION, SOLUTION EPIDURAL; INFILTRATION; INTRACAUDAL; PERINEURAL AS NEEDED
Status: DISCONTINUED | OUTPATIENT
Start: 2023-01-05 | End: 2023-01-05 | Stop reason: HOSPADM

## 2023-01-05 RX ORDER — FENTANYL CITRATE 50 UG/ML
INJECTION, SOLUTION INTRAMUSCULAR; INTRAVENOUS AS NEEDED
Status: DISCONTINUED | OUTPATIENT
Start: 2023-01-05 | End: 2023-01-05 | Stop reason: HOSPADM

## 2023-01-05 RX ORDER — POLYETHYLENE GLYCOL 3350 17 G/17G
17 POWDER, FOR SOLUTION ORAL DAILY
Qty: 20 PACKET | Refills: 0 | Status: SHIPPED | OUTPATIENT
Start: 2023-01-05 | End: 2023-01-25

## 2023-01-05 RX ADMIN — SUCCINYLCHOLINE CHLORIDE 140 MG: 20 INJECTION, SOLUTION INTRAMUSCULAR; INTRAVENOUS at 08:20

## 2023-01-05 RX ADMIN — HYDROMORPHONE HYDROCHLORIDE 1 MG: 1 INJECTION, SOLUTION INTRAMUSCULAR; INTRAVENOUS; SUBCUTANEOUS at 09:30

## 2023-01-05 RX ADMIN — FENTANYL CITRATE 25 MCG: 50 INJECTION INTRAMUSCULAR; INTRAVENOUS at 09:16

## 2023-01-05 RX ADMIN — FENTANYL CITRATE 50 MCG: 50 INJECTION INTRAMUSCULAR; INTRAVENOUS at 09:22

## 2023-01-05 RX ADMIN — WATER 2 G: 1 INJECTION INTRAMUSCULAR; INTRAVENOUS; SUBCUTANEOUS at 08:19

## 2023-01-05 RX ADMIN — ONDANSETRON HYDROCHLORIDE 4 MG: 2 INJECTION, SOLUTION INTRAMUSCULAR; INTRAVENOUS at 09:10

## 2023-01-05 RX ADMIN — ROCURONIUM BROMIDE 35 MG: 10 SOLUTION INTRAVENOUS at 08:20

## 2023-01-05 RX ADMIN — FENTANYL CITRATE 25 MCG: 50 INJECTION INTRAMUSCULAR; INTRAVENOUS at 08:06

## 2023-01-05 RX ADMIN — SODIUM CHLORIDE, POTASSIUM CHLORIDE, SODIUM LACTATE AND CALCIUM CHLORIDE 1000 ML: 600; 310; 30; 20 INJECTION, SOLUTION INTRAVENOUS at 07:07

## 2023-01-05 RX ADMIN — Medication 30 MG: at 08:25

## 2023-01-05 RX ADMIN — LIDOCAINE HYDROCHLORIDE 80 MG: 20 INJECTION, SOLUTION EPIDURAL; INFILTRATION; INTRACAUDAL; PERINEURAL at 08:06

## 2023-01-05 RX ADMIN — SODIUM CHLORIDE, POTASSIUM CHLORIDE, SODIUM LACTATE AND CALCIUM CHLORIDE: 600; 310; 30; 20 INJECTION, SOLUTION INTRAVENOUS at 08:00

## 2023-01-05 RX ADMIN — PROPOFOL 150 MG: 10 INJECTION, EMULSION INTRAVENOUS at 08:06

## 2023-01-05 RX ADMIN — NEOSTIGMINE METHYLSULFATE 3 MG: 1 INJECTION, SOLUTION INTRAVENOUS at 09:11

## 2023-01-05 RX ADMIN — ROCURONIUM BROMIDE 5 MG: 10 SOLUTION INTRAVENOUS at 08:06

## 2023-01-05 RX ADMIN — DEXAMETHASONE SODIUM PHOSPHATE 4 MG: 4 INJECTION, SOLUTION INTRAMUSCULAR; INTRAVENOUS at 08:25

## 2023-01-05 RX ADMIN — GLYCOPYRROLATE 0.4 MG: 0.2 INJECTION INTRAMUSCULAR; INTRAVENOUS at 09:11

## 2023-01-05 NOTE — OP NOTES
[address]   OPERATIVE REPORT    Name:  Sameera Sam  MR#:  [id number]  :  1945  ACCOUNT #:  [de-identified]  DATE OF SERVICE:  2023    PREOPERATIVE DIAGNOSIS:  Left inguinal hernia. POSTOPERATIVE DIAGNOSIS:  Indirect left inguinal hernia. PROCEDURE PERFORMED:  Open left inguinal hernia repair with mesh (CPT F4596500). SURGEON:  Yadi Henderson MD    ASSISTANT:  SA Jordy    ANESTHESIA:  General endotracheal.    COMPLICATIONS:  None. SPECIMENS REMOVED:  None. IMPLANTS:  An 8-cm x 12-cm ProGrip mesh. ESTIMATED BLOOD LOSS:  30 mL. DRAINS:  None. COUNTS:  Sponge count correct. Needle count correct. FINDINGS:  1. Indirect left inguinal hernia, reduced. 2.  Satisfactory mesh repair using 8-cm x 12-cm ProGrip mesh. INDICATIONS:  The patient is a 79-year-old white male with multiple medical problems to include prostate cancer, right inguinal hernia x2, with a history of open prostatectomy. He notes a longstanding, enlarging left groin bulge. On exam, he has a tender, reducible left inguinal hernia. He is taken to the operating room today for open mesh repair. PROCEDURE:  The patient was identified as the correct patient in the preoperative holding area and informed consent was confirmed. After answering the patient's remaining questions and performing site verification, he was taken to the operating room and placed on the operating room table in the supine position. Sequential compression devices were placed on both lower extremities. Following the uneventful initiation of general anesthesia, he was carefully secured to the operating room table with safety strap in place. All potential pressure points padded with eggcrate. His left groin and abdomen were prepped and draped in the usual sterile fashion. Final time-out was performed, and it was confirmed he had received intravenous antibiotics. A left groin 6-cm incision was made.   The dissection was carried through the subcutaneous fatty tissue, down through Alvaro's fascia, down to the external oblique aponeurosis. After blunt dissection to better expose this layer, the external inguinal ring was identified. The external oblique aponeurosis was opened in the direction of its fibers, allowing opening of the inguinal canal.  The spermatic cord was mobilized at the level of the pubic tubercle, followed by placement of a Penrose drain to isolate the structure. The ilioinguinal nerve was identified along the anterior aspect of the cord, and this was divided between 3-0 Vicryl sutures and resected. An indirect hernia was identified, and hernia sac was freed from cord structures, down to the peritoneal reflection. The sac was then reduced through the internal inguinal ring. No signs of direct hernia were present. An 8-cm x 12-cm segment of ProGrip mesh was opened on the back table. The mesh was placed along the floor of the inguinal canal, initially secured medially at the pubic tubercle, followed by bringing the mesh leaflets around the cord structures at the level of the peritoneal reflection, thus recreating the internal inguinal ring. The mesh was further secured along the inferior edge of the inguinal ligament, and internal oblique musculature using interrupted 2-0 Prolene sutures. The wound was irrigated with sterile saline. After confirming satisfactory hemostasis, the external oblique aponeurosis was reapproximated with a running 3-0 Vicryl suture. Alvaro's fascia was reapproximated with interrupted 3-0 Vicryl sutures, followed by skin edge reapproximation with a combination of subcuticular 4-0 Monocryl suture and Dermabond. The patient tolerated the procedure well. He was extubated in the operating room and transported to the recovery area in stable condition. The attending surgeon, Dr. Rissa Pandya, was scrubbed and present for the entire procedure.       Glory Chamberlain MD [id number]  D:  01/05/2023 10:12  T:  01/05/2023 13:07  JOB #:  [id number]

## 2023-01-05 NOTE — PROGRESS NOTES
01/05/23 0853   Family Communication   Family Update Message Procedure started   Delivery Origin Nurse    Relationship to Patient Son    Phone Number Hetal Patient [phone]   Family/Significant Other Update Called
Photo Preface (Leave Blank If You Do Not Want): Photographs on file
Detail Level: Zone

## 2023-01-05 NOTE — H&P
Subjective: Eugenio An is a 69 y.o.  male who was referred for evaluation of left groin lump. Symptoms were first noted several years ago. Symptoms did not start at work. Pain is dull, intermittent. Lump is reducible. Patient does not have symptoms of chronic constipation, chronic cough, difficulty urinating. Patient has a history of previous hernia surgery (open right inguinal hernia repair x 2); of note, repair of recurrent hernia performed in setting of open prostatectomy in 2007. He denies nausea, vomiting, fever, chills, dysuria, exertional chest pain, or wheezing. Patient Active Problem List     Diagnosis Date Noted    Left inguinal hernia 10/25/2022    Chronic renal disease, stage III 06/16/2022    Hyperthyroidism 01/18/2017    Hip dysplasia 11/15/2016    Hypercholesterolemia      Glaucoma 12/24/2015    CKD (chronic kidney disease) stage 3, GFR 30-59 ml/min ([facility]) 12/24/2015    CKD (chronic kidney disease) 02/14/2015    ADHD (attention deficit hyperactivity disorder) 02/14/2015    Hyperlipidemia 07/09/2013    Vitamin D deficiency 07/09/2013    Achilles tendinitis on left 10/06/2012    H/O prostate cancer 10/06/2012    HTN (hypertension) 08/25/2011    CML in remission ([facility] 08/25/2011    Renal cell cancer ([facility] [address] 08/25/2011    Shoulder impingement syndrome 02/01/2011    Acromioclavicular joint arthritis 02/01/2011           Past Medical History:   Diagnosis Date    Achilles tendinitis on left      Allergic rhinitis      Arthritis      Blood transfusion 2006     [facility]. [address], NO REACTION    Calculus of kidney      Cancer (HCC)       CML, Prostate Ca. Renal Cell Ca. Chronic kidney disease       TUMOR REMOVAL LT. KIDNEY    CKD (chronic kidney disease) stage 3, GFR 30-59 ml/min ([facility]) 02/14/2015     S/p Partial nephrecomies for ca.  Cr 1.6 in  January 2015    CML in remission [facility]) 08/25/2011    COVID       late Sep 2022    DDD (degenerative disc disease), lumbar 10/26/21: Lumbar MRI : multilevel degenerative change, most significant at L2-L3 and L4-L5     GERD (gastroesophageal reflux disease)      Glaucoma 12/24/2015     2015     Hip dysplasia 10/24/2016     Left groin pain . Left Hip X Ray  : L>R hip dyplasia     HTN (hypertension) 08/25/2011    Hypercholesterolemia      Hypertension      Hyperthyroidism 01/18/2017    PUD (peptic ulcer disease)      Trauma 1967     Nasal Fracture    Trauma       Concussion x 2 in his youth. 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 Zoster              Past Surgical History:   Procedure Laterality Date    COLONOSCOPY N/A 8/30/2018     COLONOSCOPY performed by Dolly Kebede MD at [address] [place] [address] ENDOSCOPY. + Diverticulosis     ENDOSCOPY, COLON, DIAGNOSTIC        HX COLONOSCOPY   3/1/07     Diverticulosis. Dr. Jessica Jason . follow up in 10 years    HX ENDOSCOPY   11/9/12     reflux esophagitis , gastritis , Dr. Miriam Alcaraz GI         COLONOSCOPY    HX HEENT   11/09/2016     b/l functional upper Blepharoplasty for Ptosis due to Dermatochalasis    HX HEENT         cataract surgery     HX HERNIA REPAIR   2006     another hernia repair in [address] [place] [address]         right shoulder arthroscopy    HX OTHER SURGICAL         BROKEN NOSE    HX PROSTATECTOMY   2006    HX UROLOGICAL         LT. KIDNEY TUMOR REMOVAL x2, bilateral hydrocelectomies     [place] ABDOMEN SURGERY PROC UNLISTED                Family History   Problem Relation Age of Onset    Dementia Mother           [place]    Hypertension Mother      Heart Attack Father 55    Hypertension Father      Cancer Brother 62         Non -HODGKINS LYMPHOMA    Stroke Maternal Grandmother      Diabetes Paternal Grandmother      Other Sister 61         Polycythemia.  unclear details       Social History            Tobacco Use    Smoking status: Former       Types: Cigars    Smokeless tobacco: Never    Tobacco comments:       CIGARS FOR 5 YEARS   Substance Use Topics    Alcohol use: Yes       Comment: 10 to 12 drinks per week            Current Outpatient Medications   Medication Sig    nirmatrelvir-ritonavir (PAXLOVID) 300 mg (150 mg x 2)-100 mg 3 pills by mouth twice daily for  5 days    pravastatin (PRAVACHOL) 10 mg tablet TAKE 1 TABLET BY MOUTH EVERY MONDAY, WEDNESDAY AND FRIDAY    zolpidem (AMBIEN) 10 mg tablet TAKE 1/2 TABLET AT BEDTIME AS NEEDED    amLODIPine (NORVASC) 5 mg tablet TAKE 1 TABLET BY MOUTH ONCE DAILY    metoprolol tartrate (LOPRESSOR) 25 mg tablet TAKE 1 TABLET BY MOUTH TWICE DAILY    pantoprazole (PROTONIX) 40 mg tablet TAKE 1 TABLET BY MOUTH ONCE DAILY    penciclovir (DENAVIR) 1 % topical cream Apply  to affected area every two (2) hours. magnesium 250 mg tab Take  by mouth daily as needed (leg cramps). codeine sulfate 30 mg tablet Take one tablet q 6 hrs,prn    loperamide (IMMODIUM) 2 mg tablet Take  by mouth. 1/2 to 1 tablet up to 4 times daily as needed for diarrhea. OTHER Melatonin 5 mg and Zinc . One qhs    OTHER Omega 6: 4- 5 tabs daily. Pre biotic 1-2 tabs daily    OTHER Saline Nasal Spray prn    OTHER Eye drop for eye pressure each night -Dr. Eros Velazco group      OTHER Glaucoma : one drop OU daily . Dr. Maddie Osorio 's group    fluticasone propionate (FLONASE) 50 mcg/actuation nasal spray nightly. allergy injection by SubCUTAneous route. imatinib (GLEEVEC) 400 mg tablet Take 400 mg by mouth daily. methylphenidate ER 36 mg 24 hr tab Take one pill daily    predniSONE (DELTASONE) 10 mg tablet Take 10 mg by mouth daily as needed for Pain. (Patient not taking: Reported on 10/25/2022)    azithromycin (ZITHROMAX) 250 mg tablet 2 pills day 1. Then 1 pill daily (Patient not taking: Reported on 10/25/2022)    ciprofloxacin HCl (CIPRO) 500 mg tablet Take 1 Tab by mouth two (2) times a day. Take for 3 to 5 days for Traveler's Diarrhea or 10 days for a Urinary or Sinus Infection.  (Patient not taking: Reported on 10/25/2022)    tadalafiL (CIALIS) 10 mg tablet Take 10 mg by mouth.      No current facility-administered medications for this visit. Allergies   Allergen Reactions    Morphine Other (comments)       FELT LIKE HE GOT \"HOT\" AND UNCOMFORTABLE    Nsaids (Non-Steroidal Anti-Inflammatory Drug) Other (comments)       HX OF STOMACH ULCER    Tylenol [Acetaminophen] Other (comments)       INTERFERENCE WITH GLEEVEC (LEUKIMIA CML)         Review of Systems:  A complete review of systems was negative except as noted in the HPI. Objective:      Visit Vitals  BP (!) 156/73 (BP 1 Location: Left upper arm, BP Patient Position: At rest)   Pulse (!) 53 Comment: normal for patient   Temp 98.1 °F (36.7 °C)   Resp 20   Ht 5' 6\" (1.676 m)   Wt 147 lb (66.7 kg)   BMI 23.73 kg/m²        Physical Exam:  GENERAL: alert, cooperative, no distress, appears stated age, EYE: negative, LYMPHATIC: No cervical or supraclavicular adenopathy. THROAT & NECK: normal, LUNG: clear to auscultation bilaterally, HEART: regular rate and rhythm, S1, S2 normal, no murmur. ABDOMEN: NABS, nondistended, soft. No ventral hernia. Tender, reducible left inguinal hernia. No right inguinal hernia. EXTREMITIES:  extremities normal, atraumatic, no cyanosis or edema, SKIN: Normal., NEUROLOGIC: negative     Assessment:      Left inguinal hernia, reducible, but tender to palpation. In the setting of prior open prostatectomy, I recommend open left inguinal hernia repair with mesh as safest option. Technical aspects of procedure reviewed along with risks (to include but not limited to bleeding, wound infection, recurrence, groin pain). Also reviewed the outpatient nature of the procedure, postoperative activity restrictions, and expected results. He desires to proceed with this procedure. Plan:      Open left inguinal hernia repair with mesh.

## 2023-01-05 NOTE — ANESTHESIA PREPROCEDURE EVALUATION
Relevant Problems   No relevant active problems       Anesthetic History   No history of anesthetic complications            Review of Systems / Medical History  Patient summary reviewed, nursing notes reviewed and pertinent labs reviewed    Pulmonary  Within defined limits                 Neuro/Psych   Within defined limits           Cardiovascular    Hypertension              Exercise tolerance: >4 METS     GI/Hepatic/Renal     GERD: well controlled           Endo/Other        Arthritis and cancer     Other Findings            Physical Exam    Airway  Mallampati: II  TM Distance: > 6 cm  Neck ROM: normal range of motion   Mouth opening: Normal     Cardiovascular  Regular rate and rhythm,  S1 and S2 normal,  no murmur, click, rub, or gallop             Dental  No notable dental hx       Pulmonary  Breath sounds clear to auscultation               Abdominal  GI exam deferred       Other Findings            Anesthetic Plan    ASA: 3  Anesthesia type: general          Induction: Intravenous  Anesthetic plan and risks discussed with: Patient

## 2023-01-05 NOTE — BRIEF OP NOTE
Brief Postoperative Note    Patient: Ernestina Hunter YOB: 1945  MRN: [id number]    Date of Procedure: 1/5/2023     Pre-Op Diagnosis: LEFT INGUINAL HERNIA    Post-Op Diagnosis: Same as preoperative diagnosis. Procedure(s):  OPEN LEFT INGUINAL HERNIA REPAIR WITH MESH    Surgeon(s):  Tammi Bella MD    Surgical Assistant: Surg Asst-1: Cecilia Lopez    Anesthesia: General     Estimated Blood Loss (mL): Minimal    Complications: None    Specimens: * No specimens in log *     Implants:   Implant Name Type Inv. Item Serial No.  Lot No. LRB No. Used Action   MESH CHALINO [id number] L INGUINAL POLY HYDRPHLC MFIL SELF - [id number]  MESH CHALINO [id number] L INGUINAL POLY HYDRPHLC MFIL SELF [id number] [employer]EN US SURGICAL_WD [id number] Left 1 Implanted       Drains: * No LDAs found *    Findings: Indirect LIH.     Electronically Signed by Fili Weaver MD on 1/5/2023 at 9:10 AM

## 2023-01-05 NOTE — ANESTHESIA POSTPROCEDURE EVALUATION
Post-Anesthesia Evaluation and Assessment    Patient: Mayte Ortega MRN: [id number]  SSN: [id number]    YOB: 1945  Age: 69 y.o. Sex: male      I have evaluated the patient and they are stable and ready for discharge from the PACU. Cardiovascular Function/Vital Signs  Visit Vitals  BP (!) 147/58   Pulse (!) 57   Temp 36.4 °C (97.5 °F)   Resp 13   Ht 5' 6\" (1.676 m)   Wt 66.7 kg (147 lb)   SpO2 97%   BMI 23.73 kg/m²       Patient is status post General anesthesia for Procedure(s):  OPEN LEFT INGUINAL HERNIA REPAIR WITH MESH. Nausea/Vomiting: None    Postoperative hydration reviewed and adequate. Pain:  Pain Scale 1: Numeric (0 - 10) (01/05/23 1000)  Pain Intensity 1: 3 (01/05/23 1000)   Managed    Neurological Status:   Neuro (WDL): Within Defined Limits (01/05/23 1000)   At baseline    Mental Status, Level of Consciousness: Alert and  oriented to person, place, and time    Pulmonary Status:   O2 Device: None (Room air) (01/05/23 1000)   Adequate oxygenation and airway patent    Complications related to anesthesia: None    Post-anesthesia assessment completed. No concerns    Signed By: Deangelo Davila MD     January 5, 2023              Procedure(s):  OPEN LEFT INGUINAL HERNIA REPAIR WITH MESH. general    <BSHSIANPOST>    INITIAL Post-op Vital signs:   Vitals Value Taken Time   /58 01/05/23 1015   Temp 36.4 °C (97.5 °F) 01/05/23 1000   Pulse 50 01/05/23 1025   Resp 13 01/05/23 1025   SpO2 94 % 01/05/23 1025   Vitals shown include unvalidated device data. [NL] : warm [de-identified] : dry skin, erythematous and shiny to cheeks, chin. Moderate amount of erythematous papules to back of neck near hairline. No rash to body

## 2023-01-16 ENCOUNTER — OFFICE VISIT (OUTPATIENT)
Dept: SURGERY | Age: 78
End: 2023-01-16
Payer: MEDICARE

## 2023-01-16 VITALS
BODY MASS INDEX: 23.63 KG/M2 | DIASTOLIC BLOOD PRESSURE: 84 MMHG | WEIGHT: 147 LBS | HEART RATE: 58 BPM | HEIGHT: 66 IN | TEMPERATURE: 98.1 F | SYSTOLIC BLOOD PRESSURE: 136 MMHG | OXYGEN SATURATION: 98 %

## 2023-01-16 DIAGNOSIS — Z09 POSTOPERATIVE EXAMINATION: Primary | ICD-10-CM

## 2023-01-16 PROCEDURE — 99024 POSTOP FOLLOW-UP VISIT: CPT

## 2023-01-16 NOTE — PATIENT INSTRUCTIONS
-  No lifting greater than 15 lbs x 4 weeks then may advance activity as tolerated. -  Ok to bath as normal and you may get incisions wet. Glue will fall off on its own. May moisturize incision sites.     -  May use supportive wear    -  May use heating pad for any surgical soreness    -  Please call with any questions or concerns     -  Follow up only as needed

## 2023-01-16 NOTE — PROGRESS NOTES
Identified pt with two pt identifiers (name and ). Reviewed chart in preparation for visit and have obtained necessary documentation. Aniyah Danielle is a 69 y.o. male  Chief Complaint   Patient presents with    Surgical Follow-up     2 weeks postop left inguinal hernia repair     Visit Vitals  /84 (BP 1 Location: Right upper arm, BP Patient Position: Sitting, BP Cuff Size: Adult) Comment (BP Cuff Size): manual cuff   Pulse (!) 58   Temp 98.1 °F (36.7 °C) (Oral)   Ht 5' 6\" (1.676 m)   Wt 147 lb (66.7 kg)   SpO2 98%   BMI 23.73 kg/m²       1. Have you been to the ER, urgent care clinic since your last visit? Hospitalized since your last visit? No    2. Have you seen or consulted any other health care providers outside of the [address] [place] [address] since your last visit? Include any pap smears or colon screening.  No

## 2023-01-16 NOTE — PROGRESS NOTES
CC: Post Operative state    Subjective: Aniyah Danielle is a 69 y.o. male presents for postop care 11 days s/p Open left inguinal hernia repair with mesh    Pt states he believes he is doing well postoperatively. His only concern is some \"hardened swelling just under his incision\". Denies any pain or any other issues with his surgical sites. He endorses he had some testicular swelling initially after surgery which has resolved. Tolerating a regular diet; No nausea or vomiting. Bowel movements are regular. The patient is voiding without difficulty. Patient denies fever, chest pain, or shortness of breath. Review of Systems:  A comprehensive review of systems was negative except for that written above      Mr. Jairo Diaz has a reminder for a \"due or due soon\" health maintenance. I have asked that he contact his primary care provider for follow-up on this health maintenance. Objective:     Visit Vitals  /84 (BP 1 Location: Right upper arm, BP Patient Position: Sitting, BP Cuff Size: Adult) Comment (BP Cuff Size): manual cuff   Pulse (!) 58   Temp 98.1 °F (36.7 °C) (Oral)   Ht 5' 6\" (1.676 m)   Wt 147 lb (66.7 kg)   SpO2 98%   BMI 23.73 kg/m²       General: alert, cooperative, no distress, appears stated age  CV: Regular rate and rhythm  Pulmonary: Lungs clear to auscultation   Abdomen:soft, bowel sounds active, non-tender  Incision:  moderate sized seroma noted, healing well, no drainage, no surrounding erythema or induration, no dehiscence, incision well approximated. No signs of infection. Assessment:       ICD-10-CM ICD-9-CM    1. Postoperative examination  Z09 V67.00           Plan:   11 days s/p Open left inguinal hernia repair with mesh    Reassured patient that surgical site was healing well and that seroma would resolve on its own. Wound care discussed. May submerge in water and continue to wash with mild soap and water. May moisturize surgical site as desired.     No lifting greater than 15 lbs x 4 weeks  then may advance activity as tolerated. May use heating pad for surgical soreness. Mynor Hackett. verbalized understanding and questions were answered to the best of my knowledge and ability. Return precautions discussed. Instructed patient to call with any questions or concerns.   Discharged from surgical care with prn follow up         > 15 minutes were spent with patient with greater than 50% of that time spent face to face counseling    Mirlande Cruz NP  [facility]   1/16/2023

## 2024-08-22 ENCOUNTER — HOSPITAL ENCOUNTER (OUTPATIENT)
Facility: HOSPITAL | Age: 79
Discharge: HOME | End: 2024-08-22
Attending: INTERNAL MEDICINE
Payer: MEDICARE

## 2024-08-22 ENCOUNTER — HOSPITAL ENCOUNTER (EMERGENCY)
Facility: HOSPITAL | Age: 79
Discharge: HOME | End: 2024-08-23
Attending: STUDENT IN AN ORGANIZED HEALTH CARE EDUCATION/TRAINING PROGRAM
Payer: MEDICARE

## 2024-08-22 DIAGNOSIS — R10.12 LEFT UPPER QUADRANT ABDOMINAL PAIN: Primary | ICD-10-CM

## 2024-08-22 DIAGNOSIS — R10.32 LLQ ABDOMINAL PAIN: ICD-10-CM

## 2024-08-22 LAB
ALBUMIN SERPL-MCNC: 4.1 G/DL (ref 3.5–5)
ALBUMIN/GLOB SERPL: 1.2 (ref 1.1–2.2)
ALP SERPL-CCNC: 68 U/L (ref 45–117)
ALT SERPL-CCNC: 30 U/L (ref 12–78)
ANION GAP SERPL CALC-SCNC: 9 MMOL/L (ref 5–15)
AST SERPL-CCNC: 39 U/L (ref 15–37)
BASOPHILS # BLD: 0 K/UL (ref 0–0.1)
BASOPHILS NFR BLD: 0 % (ref 0–1)
BILIRUB SERPL-MCNC: 1.1 MG/DL (ref 0.2–1)
BUN SERPL-MCNC: 21 MG/DL (ref 6–20)
BUN/CREAT SERPL: 13 (ref 12–20)
CALCIUM SERPL-MCNC: 9.5 MG/DL (ref 8.5–10.1)
CHLORIDE SERPL-SCNC: 99 MMOL/L (ref 97–108)
CO2 SERPL-SCNC: 23 MMOL/L (ref 21–32)
COMMENT:: NORMAL
COMMENT:: NORMAL
CREAT SERPL-MCNC: 1.56 MG/DL (ref 0.7–1.3)
DIFFERENTIAL METHOD BLD: ABNORMAL
EOSINOPHIL # BLD: 0 K/UL (ref 0–0.4)
EOSINOPHIL NFR BLD: 0 % (ref 0–7)
ERYTHROCYTE [DISTWIDTH] IN BLOOD BY AUTOMATED COUNT: 13.4 % (ref 11.5–14.5)
GLOBULIN SER CALC-MCNC: 3.5 G/DL (ref 2–4)
GLUCOSE SERPL-MCNC: 129 MG/DL (ref 65–100)
HCT VFR BLD AUTO: 38.8 % (ref 36.6–50.3)
HGB BLD-MCNC: 13.8 G/DL (ref 12.1–17)
IMM GRANULOCYTES # BLD AUTO: 0 K/UL (ref 0–0.04)
IMM GRANULOCYTES NFR BLD AUTO: 0 % (ref 0–0.5)
LYMPHOCYTES # BLD: 1.2 K/UL (ref 0.8–3.5)
LYMPHOCYTES NFR BLD: 14 % (ref 12–49)
MCH RBC QN AUTO: 32.3 PG (ref 26–34)
MCHC RBC AUTO-ENTMCNC: 35.6 G/DL (ref 30–36.5)
MCV RBC AUTO: 90.9 FL (ref 80–99)
MONOCYTES # BLD: 0.8 K/UL (ref 0–1)
MONOCYTES NFR BLD: 9 % (ref 5–13)
NEUTS SEG # BLD: 6.6 K/UL (ref 1.8–8)
NEUTS SEG NFR BLD: 77 % (ref 32–75)
NRBC # BLD: 0 K/UL (ref 0–0.01)
NRBC BLD-RTO: 0 PER 100 WBC
PLATELET # BLD AUTO: 214 K/UL (ref 150–400)
PMV BLD AUTO: 10.9 FL (ref 8.9–12.9)
POTASSIUM SERPL-SCNC: 3.5 MMOL/L (ref 3.5–5.1)
PROT SERPL-MCNC: 7.6 G/DL (ref 6.4–8.2)
RBC # BLD AUTO: 4.27 M/UL (ref 4.1–5.7)
SODIUM SERPL-SCNC: 131 MMOL/L (ref 136–145)
SPECIMEN HOLD: NORMAL
WBC # BLD AUTO: 8.7 K/UL (ref 4.1–11.1)

## 2024-08-22 PROCEDURE — 96374 THER/PROPH/DIAG INJ IV PUSH: CPT

## 2024-08-22 PROCEDURE — 74176 CT ABD & PELVIS W/O CONTRAST: CPT

## 2024-08-22 PROCEDURE — 80053 COMPREHEN METABOLIC PANEL: CPT

## 2024-08-22 PROCEDURE — 6360000002 HC RX W HCPCS: Performed by: STUDENT IN AN ORGANIZED HEALTH CARE EDUCATION/TRAINING PROGRAM

## 2024-08-22 PROCEDURE — 96375 TX/PRO/DX INJ NEW DRUG ADDON: CPT

## 2024-08-22 PROCEDURE — 2580000003 HC RX 258: Performed by: STUDENT IN AN ORGANIZED HEALTH CARE EDUCATION/TRAINING PROGRAM

## 2024-08-22 PROCEDURE — 99284 EMERGENCY DEPT VISIT MOD MDM: CPT

## 2024-08-22 PROCEDURE — 36415 COLL VENOUS BLD VENIPUNCTURE: CPT

## 2024-08-22 PROCEDURE — 85025 COMPLETE CBC W/AUTO DIFF WBC: CPT

## 2024-08-22 RX ORDER — ONDANSETRON 2 MG/ML
4 INJECTION INTRAMUSCULAR; INTRAVENOUS ONCE
Status: COMPLETED | OUTPATIENT
Start: 2024-08-22 | End: 2024-08-22

## 2024-08-22 RX ORDER — 0.9 % SODIUM CHLORIDE 0.9 %
1000 INTRAVENOUS SOLUTION INTRAVENOUS ONCE
Status: COMPLETED | OUTPATIENT
Start: 2024-08-22 | End: 2024-08-23

## 2024-08-22 RX ORDER — KETOROLAC TROMETHAMINE 30 MG/ML
15 INJECTION, SOLUTION INTRAMUSCULAR; INTRAVENOUS ONCE
Status: COMPLETED | OUTPATIENT
Start: 2024-08-22 | End: 2024-08-22

## 2024-08-22 RX ADMIN — SODIUM CHLORIDE 1000 ML: 9 INJECTION, SOLUTION INTRAVENOUS at 23:04

## 2024-08-22 RX ADMIN — ONDANSETRON 4 MG: 2 INJECTION INTRAMUSCULAR; INTRAVENOUS at 23:02

## 2024-08-22 RX ADMIN — KETOROLAC TROMETHAMINE 15 MG: 30 INJECTION, SOLUTION INTRAMUSCULAR at 23:02

## 2024-08-22 ASSESSMENT — PAIN SCALES - GENERAL: PAINLEVEL_OUTOF10: 6

## 2024-08-22 ASSESSMENT — PAIN DESCRIPTION - ORIENTATION: ORIENTATION: LEFT

## 2024-08-22 ASSESSMENT — PAIN - FUNCTIONAL ASSESSMENT
PAIN_FUNCTIONAL_ASSESSMENT: 0-10
PAIN_FUNCTIONAL_ASSESSMENT: PREVENTS OR INTERFERES SOME ACTIVE ACTIVITIES AND ADLS

## 2024-08-22 ASSESSMENT — PAIN DESCRIPTION - LOCATION: LOCATION: ABDOMEN;BACK

## 2024-08-22 ASSESSMENT — PAIN DESCRIPTION - DESCRIPTORS: DESCRIPTORS: ACHING

## 2024-08-23 ENCOUNTER — HOSPITAL ENCOUNTER (OUTPATIENT)
Facility: HOSPITAL | Age: 79
Setting detail: OBSERVATION
Discharge: HOME | End: 2024-08-24
Attending: EMERGENCY MEDICINE | Admitting: FAMILY MEDICINE
Payer: MEDICARE

## 2024-08-23 ENCOUNTER — APPOINTMENT (OUTPATIENT)
Facility: HOSPITAL | Age: 79
End: 2024-08-23
Payer: MEDICARE

## 2024-08-23 VITALS
TEMPERATURE: 98.4 F | RESPIRATION RATE: 15 BRPM | HEART RATE: 81 BPM | HEIGHT: 66 IN | WEIGHT: 145.72 LBS | SYSTOLIC BLOOD PRESSURE: 135 MMHG | OXYGEN SATURATION: 99 % | DIASTOLIC BLOOD PRESSURE: 78 MMHG | BODY MASS INDEX: 23.42 KG/M2

## 2024-08-23 DIAGNOSIS — M51.9 LUMBAR DISC DISEASE: ICD-10-CM

## 2024-08-23 DIAGNOSIS — R82.81 PYURIA: ICD-10-CM

## 2024-08-23 DIAGNOSIS — R10.9 LEFT FLANK PAIN: ICD-10-CM

## 2024-08-23 DIAGNOSIS — R10.32 ABDOMINAL PAIN, LEFT LOWER QUADRANT: Primary | ICD-10-CM

## 2024-08-23 DIAGNOSIS — N18.9 CHRONIC KIDNEY DISEASE, UNSPECIFIED CKD STAGE: ICD-10-CM

## 2024-08-23 LAB
ALBUMIN SERPL-MCNC: 3.5 G/DL (ref 3.5–5)
ALBUMIN/GLOB SERPL: 1 (ref 1.1–2.2)
ALP SERPL-CCNC: 58 U/L (ref 45–117)
ALT SERPL-CCNC: 33 U/L (ref 12–78)
ANION GAP SERPL CALC-SCNC: 3 MMOL/L (ref 5–15)
APPEARANCE UR: CLEAR
AST SERPL-CCNC: 51 U/L (ref 15–37)
BACTERIA URNS QL MICRO: NEGATIVE /HPF
BASOPHILS # BLD: 0 K/UL (ref 0–0.1)
BASOPHILS NFR BLD: 0 % (ref 0–1)
BILIRUB SERPL-MCNC: 0.9 MG/DL (ref 0.2–1)
BILIRUB UR QL: NEGATIVE
BUN SERPL-MCNC: 23 MG/DL (ref 6–20)
BUN/CREAT SERPL: 13 (ref 12–20)
CALCIUM SERPL-MCNC: 8.7 MG/DL (ref 8.5–10.1)
CHLORIDE SERPL-SCNC: 103 MMOL/L (ref 97–108)
CO2 SERPL-SCNC: 23 MMOL/L (ref 21–32)
COLOR UR: ABNORMAL
COMMENT:: NORMAL
CREAT SERPL-MCNC: 1.71 MG/DL (ref 0.7–1.3)
DIFFERENTIAL METHOD BLD: ABNORMAL
EOSINOPHIL # BLD: 0.1 K/UL (ref 0–0.4)
EOSINOPHIL NFR BLD: 1 % (ref 0–7)
EPITH CASTS URNS QL MICRO: ABNORMAL /LPF
ERYTHROCYTE [DISTWIDTH] IN BLOOD BY AUTOMATED COUNT: 13.4 % (ref 11.5–14.5)
GLOBULIN SER CALC-MCNC: 3.4 G/DL (ref 2–4)
GLUCOSE SERPL-MCNC: 112 MG/DL (ref 65–100)
GLUCOSE UR STRIP.AUTO-MCNC: NEGATIVE MG/DL
HCT VFR BLD AUTO: 32.9 % (ref 36.6–50.3)
HGB BLD-MCNC: 11.5 G/DL (ref 12.1–17)
HGB UR QL STRIP: ABNORMAL
IMM GRANULOCYTES # BLD AUTO: 0 K/UL (ref 0–0.04)
IMM GRANULOCYTES NFR BLD AUTO: 0 % (ref 0–0.5)
KETONES UR QL STRIP.AUTO: 15 MG/DL
LEUKOCYTE ESTERASE UR QL STRIP.AUTO: ABNORMAL
LIPASE SERPL-CCNC: 157 U/L (ref 13–75)
LYMPHOCYTES # BLD: 0.8 K/UL (ref 0.8–3.5)
LYMPHOCYTES NFR BLD: 12 % (ref 12–49)
MCH RBC QN AUTO: 32.1 PG (ref 26–34)
MCHC RBC AUTO-ENTMCNC: 35 G/DL (ref 30–36.5)
MCV RBC AUTO: 91.9 FL (ref 80–99)
MONOCYTES # BLD: 1 K/UL (ref 0–1)
MONOCYTES NFR BLD: 14 % (ref 5–13)
NEUTS SEG # BLD: 5 K/UL (ref 1.8–8)
NEUTS SEG NFR BLD: 73 % (ref 32–75)
NITRITE UR QL STRIP.AUTO: NEGATIVE
NRBC # BLD: 0 K/UL (ref 0–0.01)
NRBC BLD-RTO: 0 PER 100 WBC
PH UR STRIP: 5.5 (ref 5–8)
PLATELET # BLD AUTO: 167 K/UL (ref 150–400)
PMV BLD AUTO: 11.6 FL (ref 8.9–12.9)
POTASSIUM SERPL-SCNC: 4.7 MMOL/L (ref 3.5–5.1)
PROT SERPL-MCNC: 6.9 G/DL (ref 6.4–8.2)
PROT UR STRIP-MCNC: 30 MG/DL
RBC # BLD AUTO: 3.58 M/UL (ref 4.1–5.7)
RBC #/AREA URNS HPF: ABNORMAL /HPF (ref 0–5)
SODIUM SERPL-SCNC: 129 MMOL/L (ref 136–145)
SP GR UR REFRACTOMETRY: 1.01 (ref 1–1.03)
SPECIMEN HOLD: NORMAL
SPECIMEN HOLD: NORMAL
UROBILINOGEN UR QL STRIP.AUTO: 0.2 EU/DL (ref 0.2–1)
WBC # BLD AUTO: 6.9 K/UL (ref 4.1–11.1)
WBC URNS QL MICRO: ABNORMAL /HPF (ref 0–4)

## 2024-08-23 PROCEDURE — G0378 HOSPITAL OBSERVATION PER HR: HCPCS

## 2024-08-23 PROCEDURE — 96375 TX/PRO/DX INJ NEW DRUG ADDON: CPT

## 2024-08-23 PROCEDURE — 96366 THER/PROPH/DIAG IV INF ADDON: CPT

## 2024-08-23 PROCEDURE — 2580000003 HC RX 258

## 2024-08-23 PROCEDURE — 36415 COLL VENOUS BLD VENIPUNCTURE: CPT

## 2024-08-23 PROCEDURE — 6360000002 HC RX W HCPCS

## 2024-08-23 PROCEDURE — 96365 THER/PROPH/DIAG IV INF INIT: CPT

## 2024-08-23 PROCEDURE — 96376 TX/PRO/DX INJ SAME DRUG ADON: CPT

## 2024-08-23 PROCEDURE — 81001 URINALYSIS AUTO W/SCOPE: CPT

## 2024-08-23 PROCEDURE — 80053 COMPREHEN METABOLIC PANEL: CPT

## 2024-08-23 PROCEDURE — 83690 ASSAY OF LIPASE: CPT

## 2024-08-23 PROCEDURE — 6370000000 HC RX 637 (ALT 250 FOR IP)

## 2024-08-23 PROCEDURE — 87086 URINE CULTURE/COLONY COUNT: CPT

## 2024-08-23 PROCEDURE — 6360000002 HC RX W HCPCS: Performed by: PHYSICIAN ASSISTANT

## 2024-08-23 PROCEDURE — 99285 EMERGENCY DEPT VISIT HI MDM: CPT

## 2024-08-23 PROCEDURE — 76775 US EXAM ABDO BACK WALL LIM: CPT

## 2024-08-23 PROCEDURE — 96361 HYDRATE IV INFUSION ADD-ON: CPT

## 2024-08-23 PROCEDURE — 74018 RADEX ABDOMEN 1 VIEW: CPT

## 2024-08-23 PROCEDURE — 85025 COMPLETE CBC W/AUTO DIFF WBC: CPT

## 2024-08-23 RX ORDER — SODIUM CHLORIDE 9 MG/ML
INJECTION, SOLUTION INTRAVENOUS CONTINUOUS
Status: DISCONTINUED | OUTPATIENT
Start: 2024-08-23 | End: 2024-08-24

## 2024-08-23 RX ORDER — PRAVASTATIN SODIUM 10 MG
10 TABLET ORAL
Status: DISCONTINUED | OUTPATIENT
Start: 2024-08-23 | End: 2024-08-24 | Stop reason: HOSPADM

## 2024-08-23 RX ORDER — AMLODIPINE BESYLATE 5 MG/1
5 TABLET ORAL DAILY
Status: DISCONTINUED | OUTPATIENT
Start: 2024-08-23 | End: 2024-08-24 | Stop reason: HOSPADM

## 2024-08-23 RX ORDER — PANTOPRAZOLE SODIUM 40 MG/1
40 TABLET, DELAYED RELEASE ORAL 2 TIMES DAILY
Status: DISCONTINUED | OUTPATIENT
Start: 2024-08-23 | End: 2024-08-24 | Stop reason: HOSPADM

## 2024-08-23 RX ORDER — KETOROLAC TROMETHAMINE 30 MG/ML
15 INJECTION, SOLUTION INTRAMUSCULAR; INTRAVENOUS
Status: COMPLETED | OUTPATIENT
Start: 2024-08-23 | End: 2024-08-23

## 2024-08-23 RX ORDER — SENNA AND DOCUSATE SODIUM 50; 8.6 MG/1; MG/1
1 TABLET, FILM COATED ORAL DAILY
Status: DISCONTINUED | OUTPATIENT
Start: 2024-08-23 | End: 2024-08-24 | Stop reason: HOSPADM

## 2024-08-23 RX ORDER — LANOLIN ALCOHOL/MO/W.PET/CERES
3 CREAM (GRAM) TOPICAL NIGHTLY PRN
Status: DISCONTINUED | OUTPATIENT
Start: 2024-08-23 | End: 2024-08-24 | Stop reason: HOSPADM

## 2024-08-23 RX ORDER — METRONIDAZOLE 500 MG/100ML
500 INJECTION, SOLUTION INTRAVENOUS EVERY 8 HOURS
Status: DISCONTINUED | OUTPATIENT
Start: 2024-08-23 | End: 2024-08-24 | Stop reason: HOSPADM

## 2024-08-23 RX ORDER — KETOROLAC TROMETHAMINE 30 MG/ML
15 INJECTION, SOLUTION INTRAMUSCULAR; INTRAVENOUS EVERY 6 HOURS PRN
Status: DISCONTINUED | OUTPATIENT
Start: 2024-08-23 | End: 2024-08-24 | Stop reason: HOSPADM

## 2024-08-23 RX ORDER — METHYLPHENIDATE HYDROCHLORIDE 5 MG/1
10 TABLET ORAL
Status: DISCONTINUED | OUTPATIENT
Start: 2024-08-23 | End: 2024-08-24 | Stop reason: HOSPADM

## 2024-08-23 RX ORDER — POLYETHYLENE GLYCOL 3350 17 G/17G
17 POWDER, FOR SOLUTION ORAL DAILY
Status: DISCONTINUED | OUTPATIENT
Start: 2024-08-23 | End: 2024-08-24 | Stop reason: HOSPADM

## 2024-08-23 RX ORDER — METOPROLOL TARTRATE 25 MG/1
25 TABLET, FILM COATED ORAL 2 TIMES DAILY
Status: DISCONTINUED | OUTPATIENT
Start: 2024-08-23 | End: 2024-08-24 | Stop reason: HOSPADM

## 2024-08-23 RX ORDER — ONDANSETRON 2 MG/ML
4 INJECTION INTRAMUSCULAR; INTRAVENOUS EVERY 6 HOURS PRN
Status: DISCONTINUED | OUTPATIENT
Start: 2024-08-23 | End: 2024-08-24 | Stop reason: HOSPADM

## 2024-08-23 RX ADMIN — KETOROLAC TROMETHAMINE 15 MG: 30 INJECTION, SOLUTION INTRAMUSCULAR at 10:04

## 2024-08-23 RX ADMIN — PRAVASTATIN SODIUM 10 MG: 10 TABLET ORAL at 16:55

## 2024-08-23 RX ADMIN — METRONIDAZOLE 500 MG: 500 INJECTION, SOLUTION INTRAVENOUS at 22:18

## 2024-08-23 RX ADMIN — POLYETHYLENE GLYCOL 3350 17 G: 17 POWDER, FOR SOLUTION ORAL at 16:56

## 2024-08-23 RX ADMIN — KETOROLAC TROMETHAMINE 15 MG: 30 INJECTION, SOLUTION INTRAMUSCULAR at 19:08

## 2024-08-23 RX ADMIN — METOPROLOL TARTRATE 25 MG: 25 TABLET, FILM COATED ORAL at 20:50

## 2024-08-23 RX ADMIN — SODIUM CHLORIDE: 9 INJECTION, SOLUTION INTRAVENOUS at 17:01

## 2024-08-23 RX ADMIN — SENNOSIDES AND DOCUSATE SODIUM 1 TABLET: 50; 8.6 TABLET ORAL at 16:55

## 2024-08-23 RX ADMIN — METRONIDAZOLE 500 MG: 500 INJECTION, SOLUTION INTRAVENOUS at 16:58

## 2024-08-23 RX ADMIN — Medication 3 MG: at 22:17

## 2024-08-23 RX ADMIN — METHYLPHENIDATE HYDROCHLORIDE 10 MG: 5 TABLET ORAL at 16:55

## 2024-08-23 RX ADMIN — WATER 1000 MG: 1 INJECTION INTRAMUSCULAR; INTRAVENOUS; SUBCUTANEOUS at 14:37

## 2024-08-23 RX ADMIN — AMLODIPINE BESYLATE 5 MG: 5 TABLET ORAL at 16:55

## 2024-08-23 RX ADMIN — PANTOPRAZOLE SODIUM 40 MG: 40 TABLET, DELAYED RELEASE ORAL at 20:50

## 2024-08-23 ASSESSMENT — PAIN DESCRIPTION - LOCATION
LOCATION: ABDOMEN;FLANK
LOCATION: ABDOMEN
LOCATION: ABDOMEN;FLANK
LOCATION: ABDOMEN;FLANK

## 2024-08-23 ASSESSMENT — PAIN DESCRIPTION - DESCRIPTORS
DESCRIPTORS: ACHING
DESCRIPTORS: ACHING;DISCOMFORT

## 2024-08-23 ASSESSMENT — PAIN DESCRIPTION - FREQUENCY
FREQUENCY: CONTINUOUS

## 2024-08-23 ASSESSMENT — PAIN DESCRIPTION - ORIENTATION
ORIENTATION: LEFT;UPPER
ORIENTATION: LEFT;UPPER
ORIENTATION: LEFT
ORIENTATION: LEFT

## 2024-08-23 ASSESSMENT — PAIN DESCRIPTION - ONSET
ONSET: ON-GOING

## 2024-08-23 ASSESSMENT — PAIN SCALES - GENERAL
PAINLEVEL_OUTOF10: 4
PAINLEVEL_OUTOF10: 7
PAINLEVEL_OUTOF10: 7
PAINLEVEL_OUTOF10: 6
PAINLEVEL_OUTOF10: 5
PAINLEVEL_OUTOF10: 8

## 2024-08-23 ASSESSMENT — PAIN - FUNCTIONAL ASSESSMENT
PAIN_FUNCTIONAL_ASSESSMENT: PREVENTS OR INTERFERES SOME ACTIVE ACTIVITIES AND ADLS
PAIN_FUNCTIONAL_ASSESSMENT: 0-10
PAIN_FUNCTIONAL_ASSESSMENT: PREVENTS OR INTERFERES SOME ACTIVE ACTIVITIES AND ADLS
PAIN_FUNCTIONAL_ASSESSMENT: PREVENTS OR INTERFERES SOME ACTIVE ACTIVITIES AND ADLS

## 2024-08-23 ASSESSMENT — PAIN DESCRIPTION - PAIN TYPE
TYPE: ACUTE PAIN

## 2024-08-23 NOTE — ED PROVIDER NOTES
[facility] EMERGENCY DEP  EMERGENCY DEPARTMENT ENCOUNTER      Pt Name: Faizan Contreras Jr.  MRN: [id number]  Birthdate 1945  Date of evaluation: 8/22/2024  Provider: Johny Briceño MD    CHIEF COMPLAINT       Chief Complaint   Patient presents with    Abdominal Pain    Flank Pain         HISTORY OF PRESENT ILLNESS   (Location/Symptom, Timing/Onset, Context/Setting, Quality, Duration, Modifying Factors, Severity)  Note limiting factors.   Patient is a 79-year-old male present emergency department with abdominal pain.  Patient recently seen his PCP Dr. Longoria who obtain labs and CT imaging earlier today CT unremarkable shows no acute pathology patient does have diverticulosis on CT imaging without evidence of diverticulitis.  Patient also states that he has a history of renal calculi says that the pain started in the left upper quadrant now is in the left lower quadrant radiating to the left flank.  Patient was started on Cipro and Flagyl by PCP.            Review of External Medical Records:     Nursing Notes were reviewed.    REVIEW OF SYSTEMS    (2-9 systems for level 4, 10 or more for level 5)     Review of Systems    Except as noted above the remainder of the review of systems was reviewed and negative.       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  Renal Cell Ca.    Chronic kidney disease     TUMOR REMOVAL LT. KIDNEY    CKD (chronic kidney disease) stage 3, GFR 30-59 ml/min ([facility]) 02/14/2015    S/p Partial nephrecomies for ca. Cr 1.6 in  January 2015    CML in remission ([facility]) 08/25/2011    COVID     late Sep 2022    DDD (degenerative disc disease), lumbar     10/26/21: Lumbar MRI : multilevel degenerative change, most significant at L2-L3 and L4-L5     GERD      Socioeconomic History    Marital status:    Tobacco Use    Smoking status: Former     Current packs/day: 0.00     Types: Cigarettes     Quit date: 2005     Years since quittin.7    Smokeless tobacco: Never   Substance and Sexual Activity    Alcohol use: Yes     Alcohol/week: 19.0 standard drinks of alcohol    Drug use: Yes     Types: Marijuana (Weed)           PHYSICAL EXAM    (up to 7 for level 4, 8 or more for level 5)     ED Triage Vitals [24 2202]   BP Systolic BP Percentile Diastolic BP Percentile Temp Temp Source Pulse Respirations SpO2   (!) 164/94 -- -- 98.4 °F (36.9 °C) Oral 93 18 94 %      Height Weight - Scale         1.676 m (5' 6\") 66.1 kg (145 lb 11.6 oz)             Body mass index is 23.52 kg/m².    Physical Exam  Vitals and nursing note reviewed.   Constitutional:       Appearance: Normal appearance.   HENT:      Head: Normocephalic and atraumatic.   Eyes:      Extraocular Movements: Extraocular movements intact.      Pupils: Pupils are equal, round, and reactive to light.   Cardiovascular:      Rate and Rhythm: Normal rate and regular rhythm.      Pulses: Normal pulses.      Heart sounds: Normal heart sounds.   Pulmonary:      Effort: Pulmonary effort is normal.      Breath sounds: Normal breath sounds.   Abdominal:      General: Abdomen is flat.      Palpations: Abdomen is soft.      Tenderness: There is abdominal tenderness in the periumbilical area, left upper quadrant and left lower quadrant.   Musculoskeletal:         General: Normal range of motion.      Cervical back: Normal range of motion and neck supple.   Skin:     General: Skin is warm and dry.   Neurological:      General: No focal deficit present.      Mental Status: He is alert and oriented to person, place, and time.   Psychiatric:         Mood and Affect: Mood normal.         Behavior: Behavior normal.         DIAGNOSTIC RESULTS     EKG: All EKG's are interpreted by the Emergency Department Physician who

## 2024-08-23 NOTE — ED NOTES
ED TO INPATIENT SBAR HANDOFF    Patient Name: Faizan Contreras Jr.   :  1945  79 y.o.   MRN:  [id number]  ED Room #:  ER05/05     Situation  Code Status: Full Code   Allergies: Acetaminophen, Morphine, and Nsaids  Weight: Patient Vitals for the past 96 hrs (Last 3 readings):   Weight   24 0847 66.4 kg (146 lb 6.2 oz)       Arrived from: home    Chief Complaint:   Chief Complaint   Patient presents with    Flank Pain    Abdominal Pain       Hospital Problem/Diagnosis:  Principal Problem:    Abdominal pain  Resolved Problems:    * No resolved hospital problems. *      Mobility: no current mobility problem   ED Fall Risk: Presents to emergency department  because of falls (Syncope, seizure, or loss of consciousness): No, Age > 70: Yes, Altered Mental Status, Intoxication with alcohol or substance confusion (Disorientation, impaired judgment, poor safety awaremess, or inability to follow instructions): No, Impaired Mobility: Ambulates or transfers with assistive devices or assistance; Unable to ambulate or transer.: No, Nursing Judgement: Yes   Fell in ED or prior to admission: no   Restraints: no     Sitter: no   Family/Caregiver Present: no    Neet to know social/safety information: n/a    Background  History:   Past Medical History:   Diagnosis Date    Allergic rhinitis     Arthritis     Primary OA 1st Left CMC joint    Bladder carcinoma (HCC) 2024    2 high grade TCC lesions removed by [facility] - Dr Weinberg    Calculus of kidney     Cancer ([facility])     CML, Prostate Ca.  Renal Cell Ca.    Chronic kidney disease     TUMOR REMOVAL LT. KIDNEY    CKD (chronic kidney disease) stage 3, GFR 30-59 ml/min ([facility]) 2015    S/p Partial nephrecomies for ca. Cr 1.6 in  2015    CML in remission ([facility]) 2011    COVID     late Sep 2022    DDD (degenerative disc disease), lumbar     10/26/21: Lumbar MRI : multilevel degenerative change, most significant at L2-L3 and L4-L5     GERD (gastroesophageal reflux

## 2024-08-23 NOTE — ED NOTES
8:52 AM  I have evaluated the patient as the Provider in Rapid Medical Evaluation (RME). I have reviewed his vital signs and the triage nurse assessment. I have talked with the patient and any available family and advised that I am the provider in triage and have ordered the appropriate study to initiate their work up based on the clinical presentation during my assessment. I have advised that the patient will be accommodated in the Main ED as soon as possible. I have also requested to contact the triage nurse or myself immediately if the patient experiences any changes in their condition during this brief waiting period.  ELLIE Sainz Jr. is a 79 y.o. male presenting to ED for 7/10 left flank pain x 1 week.  Reports see yesterday for similar pain with no significant findings.  Pt is taking flagyl and cipro as prescribed for diverticulitis.  CT abd pelvis 08/22/2024 unremarkable.  Pt referred to ED by PCP for pain control.       Amandeep Francisco PA-C  08/23/24 [id number]

## 2024-08-23 NOTE — ED PROVIDER NOTES
[facility] EMERGENCY DEP  EMERGENCY DEPARTMENT ENCOUNTER      Pt Name: Faizan Contreras Jr.  MRN: [id number]  Birthdate 1945  Date of evaluation: 8/23/2024  Provider: Jitendra Thompson MD    CHIEF COMPLAINT       Chief Complaint   Patient presents with    Flank Pain    Abdominal Pain         HISTORY OF PRESENT ILLNESS   (Location/Symptom, Timing/Onset, Context/Setting, Quality, Duration, Modifying Factors, Severity)  Note limiting factors.   HPI    55-year-old male with a past medical history significant for hypertension and kidney cancer presented to the Emergency Department via self-transport. History provided by the patient. He reports left-sided abdominal pain radiating to the left flank for one week. The pain started on Monday and has been persistent since then. The patient describes the pain as worsening with movement, such as bending over to tie his shoes. He also experienced some nausea this morning but denies any urinary symptoms. The patient was seen yesterday for the same issue and was given Zofran, Toradol, and IV fluids. He has been taking Cipro since Monday, prescribed by Dr. Longoria, but only took one dose on Monday and two doses subsequently. He denies any history of diverticulitis. The patient had a CT scan yesterday, which showed no acute abnormalities.         Review of External Medical Records:     Nursing Notes were reviewed.    REVIEW OF SYSTEMS    (2-9 systems for level 4, 10 or more for level 5)     Review of Systems    Except as noted above the remainder of the review of systems was reviewed and negative.       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  Renal Cell Ca.    Chronic kidney disease     TUMOR REMOVAL LT. KIDNEY    CKD (chronic kidney  Practitioner             Jitendra Thompson MD  08/23/24 2045

## 2024-08-23 NOTE — ED TRIAGE NOTES
Patient arrives to ED reports left lower abdominal pain radiating into flank since Monday.  Patient reports having an outpatient CT scan this afternoon.

## 2024-08-23 NOTE — ED TRIAGE NOTES
Patient arrives ambulatory to ED with complaint of left sided abdominal pain radiating left flank x1 week. Patient was seen yesterday for the same. Patient was seen by PCP today and referred back to ED.

## 2024-08-23 NOTE — CONSULTS
[facility]    [address] [address]          GASTROENTEROLOGY CONSULTATION NOTE  Nicol Garcia PA-C  [phone] office  NP/PA in-hospital M-F until 4:30PM  After 5PM or on weekends, please call  for physician on call        NAME:  Faizan Contreras Jr.   :   1945   MRN:   [id number]       Referring Physician: Anil    Consult Date: 2024 3:15 PM    Chief Complaint: abd pain     History of Present Illness:  Patient is a 79 y.o. who is seen in consultation at the request of Jodi Ma for abd pain. The patient has a past medical history as below. He reports LLQ pain that started last week. He went to his PCP who prescribed antibiotics for suspected diverticulitis. The pain has not subsided so he presented to the ER. No clear triggers for abdominal pain. Reports recent constipation with a last bowel movement 4 days ago. No hematochezia or melena. On daily protonix for heartburn but has not taken in the last week due to the addition of his antibiotic regimen. Reports a history of kidney issues including two previous kidney surgeries to remove a tumor. Last colonoscopy was in 2018 by Dr. Rutherford showing mild sigmoid diverticulosis, recall 10 years.    I have reviewed the emergency room note, hospital admission note, notes by all other clinicians who have seen the patient during this hospitalization to date. I have reviewed the problem list and the reason for this hospitalization. I have reviewed the allergies and the medications the patient was taking at home prior to this hospitalization.    PMH:  Past Medical History:   Diagnosis Date    Allergic rhinitis     Arthritis     Primary OA 1st Left CMC joint    Bladder carcinoma (HCC) 2024    2 high grade TCC lesions removed by TURBT - Dr Weinberg    Calculus of kidney     Cancer (HCC)     CML, Prostate Ca.  Renal Cell Ca.    Chronic kidney disease     TUMOR REMOVAL LT. KIDNEY    CKD (chronic kidney disease)  stage 3, GFR 30-59 ml/min ([facility]) 02/14/2015    S/p Partial nephrecomies for ca. Cr 1.6 in  January 2015    CML in remission ([facility]) 08/25/2011    COVID     late Sep 2022    DDD (degenerative disc disease), lumbar     10/26/21: Lumbar MRI : multilevel degenerative change, most significant at L2-L3 and L4-L5     GERD (gastroesophageal reflux disease)     Glaucoma 12/24/2015 2015     Hip dysplasia 10/24/2016    Left groin pain . Left Hip X Ray  : L>R hip dyplasia     HTN (hypertension) 08/25/2011    Hypercholesterolemia     Hypertension     Hyperthyroidism 01/18/2017    PUD (peptic ulcer disease)     Trauma 1967    Nasal Fracture    Trauma     Concussion x 2 in his youth. 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     Zoster        PSH:  Past Surgical History:   Procedure Laterality Date    COLONOSCOPY      COLONOSCOPY N/A 08/30/2018    COLONOSCOPY performed by Gigi Rutherford MD at [facility] ENDOSCOPY. + Diverticulosis     COLONOSCOPY  03/01/2007    Diverticulosis. Dr. Rutherford . follow up in 10 years    GI      COLONOSCOPY    HEENT      cataract surgery     HEENT  11/09/2016    b/l functional upper Blepharoplasty for Ptosis due to Dermatochalasis    HERNIA REPAIR Left 01/05/2023    Open left inguinal hernia repair with mesh by Dr. Trevino    HERNIA REPAIR  2006    another hernia repair in 1975    ORTHOPEDIC SURGERY      right shoulder arthroscopy    OTHER SURGICAL HISTORY      BROKEN NOSE    AR UNLISTED PROCEDURE ABDOMEN PERITONEUM & OMENTUM      PROSTATECTOMY  2006    UPPER GASTROINTESTINAL ENDOSCOPY  11/09/2012    reflux esophagitis , gastritis , Dr. Rutherford    UROLOGICAL SURGERY      LT. KIDNEY TUMOR REMOVAL x2, bilateral hydrocelectomies        Allergies:  Allergies   Allergen Reactions    Acetaminophen Other (See Comments)     INTERFERENCE WITH GLEEVEC (LEUKIMIA CML)    Morphine Other (See Comments)     FELT LIKE HE GOT \"HOT\" AND UNCOMFORTABLE    Nsaids Other (See Comments)     HX OF STOMACH ULCER       Home

## 2024-08-23 NOTE — H&P
History and Physical    Date of Service:  8/23/2024  Primary Care Provider: NOMI Morales IV, MD  Source of information: patient, electronic medical record    Chief Complaint: Flank Pain and Abdominal Pain      History of Presenting Illness:   Faizan Contreras Jr. is a 79 y.o. male with past medical history of bladder cancer (TURBT 01/2024 with Dr. Weinberg), CML (Dr. Monroy), CKD stage 3, HTN, hyperthyroidism, DDD, GERD, and PUD who presented with complaints of LLQ pain that started last week. He was seen by PCP 8/19 and placed on cipro and flagyl for suspected diverticulitis. He notes that pain continued to worsen. He spoke with PCP on 8/22, underwent abdomen/pelvis CT which showed diverticulosis. He presented to ED 8/22 due to pain and was discharged home once feeling better. He was seen again by PCP this morning for worsened pain and was referred to ED. Hospitalist was consulted for admission.     Patient seen and examined, lying on stretcher. Endorses intermittent LLQ pain that radiates to left flank. Reports temperature at home has been around 99.5. Denies dysuria, nausea, and vomiting. Last BM was on Monday - reports hard BM followed by loose stool. Normally has BM 2-3 times daily. Has been following clear liquid diet at home.    Vitals on arrival to ED: temp 97.4, RR 18, HR 58, /81, SpO2 97% (room air)  Remarkable Labs: Na 129, lipase 157, UA with 5-10 WBC and trace leukocyte esterase  ED Meds: 15 mg ketorolac     REVIEW OF SYSTEMS:  Pertinent items are noted in the History of Present Illness.     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  Renal Cell Ca.    Chronic kidney disease     TUMOR REMOVAL LT. KIDNEY    CKD (chronic kidney disease) stage 3, GFR 30-59 ml/min (HCC)  in Microbiology dept for 2 days.  If unpreserved urine is submitted, it cannot be used for addtional testing after 24 hours, recollection will be required.          Culture, Urine [[id number]] Collected: 08/23/24 0914    Order Status: Sent Specimen: Urine, clean catch Updated: 08/23/24 1348    Urine Culture Hold Sample [[id number]] Collected: 08/22/24 2217    Order Status: Completed Specimen: Urine Updated: 08/22/24 2239     Specimen HOld       Urine on hold in Microbiology dept for 2 days.  If unpreserved urine is submitted, it cannot be used for addtional testing after 24 hours, recollection will be required.                   IMAGING:   XR ABDOMEN (KUB) (SINGLE AP VIEW)   Final Result   Unremarkable KUB.      Electronically signed by GERDA [employer]    (Results Pending)        ECG/ECHO:    No results found for this or any previous visit (from the past 4464 hour(s)).  No results found for this or any previous visit.        Notes reviewed from all clinical/nonclinical/nursing services involved in patient's clinical care. Care coordination discussions were held with appropriate clinical/nonclinical/ nursing providers based on care coordination needs.     Assessment/Plan:   LLQ pain  L flank pain  - admit to med/surg for observation  - abd/pel CT (8/22): diverticulosis  - KUB unremarkable  - renal US ordered  - clear liquid diet, advance as tolerated to bland diet  - GI consulted  - WBC 6.9, patient afebrile  - PRN ondansetron and toradol  - started on cipro and flagyl outpatient, will give ceftriaxone and flagyl    Possible UTI  - UA: 5-10 WBC, trace leukocyte esterase  - urine culture pending  - ceftriaxone as above    Hyponatremia  - likely hypovolemic due to poor oral intake  - start low-dose NS  - follow labs    Elevated lipase  - unclear significance, patient without signs/symptoms of pancreatitis  - repeat level in morning    CKD Stage 3  - creatinine 1.71, baseline appears to be

## 2024-08-24 VITALS
RESPIRATION RATE: 20 BRPM | WEIGHT: 146.39 LBS | SYSTOLIC BLOOD PRESSURE: 156 MMHG | OXYGEN SATURATION: 98 % | HEART RATE: 62 BPM | HEIGHT: 66 IN | BODY MASS INDEX: 23.53 KG/M2 | DIASTOLIC BLOOD PRESSURE: 76 MMHG | TEMPERATURE: 98.1 F

## 2024-08-24 LAB
ANION GAP SERPL CALC-SCNC: 6 MMOL/L (ref 5–15)
BACTERIA SPEC CULT: NORMAL
BASOPHILS # BLD: 0 K/UL (ref 0–0.1)
BASOPHILS NFR BLD: 1 % (ref 0–1)
BUN SERPL-MCNC: 18 MG/DL (ref 6–20)
BUN/CREAT SERPL: 13 (ref 12–20)
CALCIUM SERPL-MCNC: 8.4 MG/DL (ref 8.5–10.1)
CHLORIDE SERPL-SCNC: 106 MMOL/L (ref 97–108)
CO2 SERPL-SCNC: 25 MMOL/L (ref 21–32)
CREAT SERPL-MCNC: 1.36 MG/DL (ref 0.7–1.3)
DIFFERENTIAL METHOD BLD: ABNORMAL
EOSINOPHIL # BLD: 0.1 K/UL (ref 0–0.4)
EOSINOPHIL NFR BLD: 2 % (ref 0–7)
ERYTHROCYTE [DISTWIDTH] IN BLOOD BY AUTOMATED COUNT: 13.4 % (ref 11.5–14.5)
GLUCOSE SERPL-MCNC: 126 MG/DL (ref 65–100)
HCT VFR BLD AUTO: 33.6 % (ref 36.6–50.3)
HGB BLD-MCNC: 11.5 G/DL (ref 12.1–17)
IMM GRANULOCYTES # BLD AUTO: 0 K/UL (ref 0–0.04)
IMM GRANULOCYTES NFR BLD AUTO: 0 % (ref 0–0.5)
LIPASE SERPL-CCNC: 84 U/L (ref 13–75)
LYMPHOCYTES # BLD: 1.1 K/UL (ref 0.8–3.5)
LYMPHOCYTES NFR BLD: 20 % (ref 12–49)
MCH RBC QN AUTO: 31.6 PG (ref 26–34)
MCHC RBC AUTO-ENTMCNC: 34.2 G/DL (ref 30–36.5)
MCV RBC AUTO: 92.3 FL (ref 80–99)
MONOCYTES # BLD: 0.8 K/UL (ref 0–1)
MONOCYTES NFR BLD: 14 % (ref 5–13)
NEUTS SEG # BLD: 3.4 K/UL (ref 1.8–8)
NEUTS SEG NFR BLD: 63 % (ref 32–75)
NRBC # BLD: 0 K/UL (ref 0–0.01)
NRBC BLD-RTO: 0 PER 100 WBC
PLATELET # BLD AUTO: 188 K/UL (ref 150–400)
PMV BLD AUTO: 11 FL (ref 8.9–12.9)
POTASSIUM SERPL-SCNC: 4.1 MMOL/L (ref 3.5–5.1)
RBC # BLD AUTO: 3.64 M/UL (ref 4.1–5.7)
SERVICE CMNT-IMP: NORMAL
SODIUM SERPL-SCNC: 137 MMOL/L (ref 136–145)
WBC # BLD AUTO: 5.4 K/UL (ref 4.1–11.1)

## 2024-08-24 PROCEDURE — 36415 COLL VENOUS BLD VENIPUNCTURE: CPT

## 2024-08-24 PROCEDURE — 85025 COMPLETE CBC W/AUTO DIFF WBC: CPT

## 2024-08-24 PROCEDURE — 83690 ASSAY OF LIPASE: CPT

## 2024-08-24 PROCEDURE — 96376 TX/PRO/DX INJ SAME DRUG ADON: CPT

## 2024-08-24 PROCEDURE — 2580000003 HC RX 258

## 2024-08-24 PROCEDURE — 96366 THER/PROPH/DIAG IV INF ADDON: CPT

## 2024-08-24 PROCEDURE — 6370000000 HC RX 637 (ALT 250 FOR IP)

## 2024-08-24 PROCEDURE — 96361 HYDRATE IV INFUSION ADD-ON: CPT

## 2024-08-24 PROCEDURE — G0378 HOSPITAL OBSERVATION PER HR: HCPCS

## 2024-08-24 PROCEDURE — 6370000000 HC RX 637 (ALT 250 FOR IP): Performed by: STUDENT IN AN ORGANIZED HEALTH CARE EDUCATION/TRAINING PROGRAM

## 2024-08-24 PROCEDURE — 6360000002 HC RX W HCPCS

## 2024-08-24 PROCEDURE — 80048 BASIC METABOLIC PNL TOTAL CA: CPT

## 2024-08-24 RX ORDER — SENNA AND DOCUSATE SODIUM 50; 8.6 MG/1; MG/1
1 TABLET, FILM COATED ORAL DAILY
Qty: 30 TABLET | Refills: 0 | Status: SHIPPED | OUTPATIENT
Start: 2024-08-25 | End: 2024-09-24

## 2024-08-24 RX ORDER — TRAMADOL HYDROCHLORIDE 50 MG/1
50 TABLET ORAL EVERY 8 HOURS PRN
Qty: 6 TABLET | Refills: 0 | Status: SHIPPED | OUTPATIENT
Start: 2024-08-24 | End: 2024-08-26

## 2024-08-24 RX ORDER — CEFDINIR 300 MG/1
300 CAPSULE ORAL 2 TIMES DAILY
Qty: 8 CAPSULE | Refills: 0 | Status: SHIPPED | OUTPATIENT
Start: 2024-08-24 | End: 2024-08-28

## 2024-08-24 RX ORDER — POLYETHYLENE GLYCOL 3350 17 G/17G
17 POWDER, FOR SOLUTION ORAL DAILY
Qty: 30 PACKET | Refills: 0 | Status: SHIPPED | OUTPATIENT
Start: 2024-08-25 | End: 2024-09-24

## 2024-08-24 RX ORDER — LIDOCAINE 4 G/G
1 PATCH TOPICAL DAILY
Qty: 30 EACH | Refills: 0 | Status: SHIPPED | OUTPATIENT
Start: 2024-08-24 | End: 2024-09-23

## 2024-08-24 RX ORDER — LIDOCAINE 4 G/G
1 PATCH TOPICAL DAILY
Status: DISCONTINUED | OUTPATIENT
Start: 2024-08-24 | End: 2024-08-24 | Stop reason: HOSPADM

## 2024-08-24 RX ADMIN — METRONIDAZOLE 500 MG: 500 INJECTION, SOLUTION INTRAVENOUS at 06:44

## 2024-08-24 RX ADMIN — PANTOPRAZOLE SODIUM 40 MG: 40 TABLET, DELAYED RELEASE ORAL at 08:44

## 2024-08-24 RX ADMIN — POLYETHYLENE GLYCOL 3350 17 G: 17 POWDER, FOR SOLUTION ORAL at 08:45

## 2024-08-24 RX ADMIN — KETOROLAC TROMETHAMINE 15 MG: 30 INJECTION, SOLUTION INTRAMUSCULAR at 08:57

## 2024-08-24 RX ADMIN — SODIUM CHLORIDE: 9 INJECTION, SOLUTION INTRAVENOUS at 00:20

## 2024-08-24 RX ADMIN — METOPROLOL TARTRATE 25 MG: 25 TABLET, FILM COATED ORAL at 08:45

## 2024-08-24 RX ADMIN — AMLODIPINE BESYLATE 5 MG: 5 TABLET ORAL at 08:45

## 2024-08-24 RX ADMIN — METHYLPHENIDATE HYDROCHLORIDE 10 MG: 5 TABLET ORAL at 08:44

## 2024-08-24 RX ADMIN — SENNOSIDES AND DOCUSATE SODIUM 1 TABLET: 50; 8.6 TABLET ORAL at 08:44

## 2024-08-24 ASSESSMENT — PAIN DESCRIPTION - ORIENTATION: ORIENTATION: LEFT;LOWER

## 2024-08-24 ASSESSMENT — PAIN DESCRIPTION - DESCRIPTORS: DESCRIPTORS: NAGGING

## 2024-08-24 ASSESSMENT — PAIN DESCRIPTION - LOCATION: LOCATION: BACK

## 2024-08-24 ASSESSMENT — PAIN SCALES - GENERAL: PAINLEVEL_OUTOF10: 4

## 2024-08-24 NOTE — PLAN OF CARE
Problem: Pain  Goal: Verbalizes/displays adequate comfort level or baseline comfort level  8/24/2024 1007 by Romina Cowart, RN  Outcome: Progressing  8/24/2024 0438 by Carolin Butler, RN  Outcome: Progressing     Problem: Safety - Adult  Goal: Free from fall injury  Outcome: Progressing

## 2024-08-24 NOTE — DISCHARGE SUMMARY
Discharge Summary       PATIENT ID: Faizan Contreras Jr.  MRN: [id number]   YOB: 1945    DATE OF ADMISSION: 8/23/2024  8:54 AM    DATE OF DISCHARGE: 08/24/24    PRIMARY CARE PROVIDER: NOMI Morales IV, MD     ATTENDING PHYSICIAN: Regina Schuler MD   DISCHARGING PROVIDER: Regina Schuler MD    To contact this individual call [phone] and ask the  to page.  If unavailable ask to be transferred the Adult Hospitalist Department.    CONSULTATIONS: IP CONSULT TO PRIMARY CARE PROVIDER  IP CONSULT TO GI    PROCEDURES/SURGERIES: * No surgery found *     ADMITTING DIAGNOSES & HOSPITAL COURSE:   HPI: \"Faizan Contreras Jr. is a 79 y.o. male with past medical history of bladder cancer (TURBT 01/2024 with Dr. Weinberg), CML (Dr. Monroy), CKD stage 3, HTN, hyperthyroidism, DDD, GERD, and PUD who presented with complaints of LLQ pain that started last week. He was seen by PCP 8/19 and placed on cipro and flagyl for suspected diverticulitis. He notes that pain continued to worsen. He spoke with PCP on 8/22, underwent abdomen/pelvis CT which showed diverticulosis. He presented to ED 8/22 due to pain and was discharged home once feeling better. He was seen again by PCP this morning for worsened pain and was referred to ED. Hospitalist was consulted for admission.      Patient seen and examined, lying on stretcher. Endorses intermittent LLQ pain that radiates to left flank. Reports temperature at home has been around 99.5. Denies dysuria, nausea, and vomiting. Last BM was on Monday - reports hard BM followed by loose stool. Normally has BM 2-3 times daily. Has been following clear liquid diet at home.     Vitals on arrival to ED: temp 97.4, RR 18, HR 58, /81, SpO2 97% (room air)  Remarkable Labs: Na 129, lipase 157, UA with 5-10 WBC and trace leukocyte esterase  ED Meds: 15 mg ketorolac\"        DISCHARGE DIAGNOSES / PLAN:      LLQ pain  L flank pain  - abd/pel CT (8/22): diverticulosis  - KUB  unremarkable  -could be msk related   - renal US with small fluid collection, this could be post op changes  doubt abscess and no sig clinical evidence of pyelo, ct did not visualize anything significant, patient is afebrile with no leukocytosis mildly positive UA empirically treat for UTI/gi related issues dc with augmentin and cefdinir complete 5 days total, recommend rpt ct with pcp in a week or 2 to reassess   - tolerating diet   - GI consulted, appreciate recs could be constipated related continue standing laxatives  - WBC 6.9, patient afebrile  - PRN ondansetron /pain control   -was on cipro and flagyl outpatient        Possible UTI  - UA: 5-10 WBC, trace leukocyte esterase  - urine culture pending  - ceftriaxone as above     Hyponatremia  - likely hypovolemic due to poor oral intake  - start low-dose NS  - follow labs     Elevated lipase  - unclear significance, patient without signs/symptoms of pancreatitis  - repeat level in morning     CKD Stage 3  - creatinine 1.71, baseline appears to be ~1.6-1.7  - follow labs     HTN  - continue home metoprolol and amlodipine     Hyperthyroidism  - not on meds PTA, needs follow up with PCP     GERD  PUD  - continue BID PPI     Hx of bladder cancer s/p TURBT 01/2024  - outpatient follow up with Dr. Weinberg     CML  - holding Gleevec  - outpatient follow up with Dr. Porfirio Burciaga home today      FOLLOW UP APPOINTMENTS:    Follow-up Information       Follow up With Specialties Details Why Contact Info    NOMI Morales IV, MD Internal Medicine Call in 1 day(s)  [address] [facility] [address] [phone]                 ADDITIONAL CARE RECOMMENDATIONS: rpt cbc and bmp 3-5days     DIET: regular diet    ACTIVITY: activity as tolerated          DISCHARGE MEDICATIONS:     Medication List        START taking these medications      amoxicillin-clavulanate 875-125 MG per tablet  Commonly known as: AUGMENTIN  Take 1 tablet by mouth 2 times daily for 4 days     cefdinir 300  CHOLECALCIFEROL           * This list has 3 medication(s) that are the same as other medications prescribed for you. Read the directions carefully, and ask your doctor or other care provider to review them with you.                STOP taking these medications      ciprofloxacin 500 MG tablet  Commonly known as: CIPRO     metroNIDAZOLE 500 MG tablet  Commonly known as: FLAGYL               Where to Get Your Medications        These medications were sent to [facility]/pharmacy #[id number] - [place], [place] - [address] - P [phone] - F [phone] [address] [address]      Phone: [phone]   amoxicillin-clavulanate 875-125 MG per tablet  cefdinir 300 MG capsule  lidocaine 4 % external patch  polyethylene glycol 17 g packet  sennosides-docusate sodium 8.6-50 MG tablet  traMADol 50 MG tablet           NOTIFY YOUR PHYSICIAN FOR ANY OF THE FOLLOWING:   Fever over 101 degrees for 24 hours.   Chest pain, shortness of breath, fever, chills, nausea, vomiting, diarrhea, change in mentation, falling, weakness, bleeding. Severe pain or pain not relieved by medications.  Or, any other signs or symptoms that you may have questions about.    DISPOSITION:    Home With:   OT  PT  HH  RN       Long term SNF/Inpatient Rehab   x Independent/assisted living    Hospice    Other:       PATIENT CONDITION AT DISCHARGE:     Functional status    Poor     Deconditioned    x Independent      Cognition    x Lucid     Forgetful     Dementia      Catheters/lines (plus indication)    [place]     PICC     PEG    x None      Code status    x Full code     DNR      PHYSICAL EXAMINATION AT DISCHARGE:    General : alert x 3, awake, no acute distress,   HEENT: PEERL, EOMI, moist mucus membrane  Neck: supple, no JVD, no meningeal signs  Chest: Clear to auscultation bilaterally   CVS: S1 S2 heard, Capillary refill less than 2 seconds  Abd: soft/ Non tender, non distended, BS physiological, very mild tenderness palpation left flank

## 2025-06-12 ENCOUNTER — TELEPHONE (OUTPATIENT)
Age: 80
End: 2025-06-12

## 2025-06-23 NOTE — PROGRESS NOTES
[facility] [place] [facility]  [address], [address], [place] [address]  W: [phone]  F: [phone]    Reason for Visit:   Faizan Contreras Jr. is a 79 y.o. male who is seen in consultation at the request of Dr. Weinberg for evaluation of NMIBC.    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  History as outlined as above.  He is also on Gleevec for CML.  He has been in major molecular remission.  He has had noninvasive bladder cancer since 2024.  Treated with TURBT, intravesical Gemzar, more recently completed intravesical BCG 1/2025.  Most recent recurrence July 2025 with T1 disease. He has seen Dr. Monroy with [facility].     He has been feeling well.   Plans to have a repeat TURBT 8/2025.  Has had leg cramps with Gleevac     Sister has a blood disorder, brother had NHL.   He states that he grew up being exposed to chemicals     Past Medical History:   Diagnosis Date    Allergic rhinitis     Arthritis     Primary OA 1st Left CMC joint    Bladder carcinoma (HCC) 01/23/2024    2 high grade TCC lesions removed by TURBT - Dr Weinberg; later

## 2025-06-26 ENCOUNTER — CLINICAL DOCUMENTATION (OUTPATIENT)
Age: 80
End: 2025-06-26

## 2025-06-26 ENCOUNTER — INITIAL CONSULT (OUTPATIENT)
Age: 80
End: 2025-06-26
Payer: MEDICARE

## 2025-06-26 VITALS
TEMPERATURE: 98.2 F | DIASTOLIC BLOOD PRESSURE: 58 MMHG | OXYGEN SATURATION: 97 % | SYSTOLIC BLOOD PRESSURE: 139 MMHG | RESPIRATION RATE: 16 BRPM | BODY MASS INDEX: 24.07 KG/M2 | WEIGHT: 148 LBS | HEART RATE: 58 BPM

## 2025-06-26 DIAGNOSIS — Z85.46 H/O PROSTATE CANCER: ICD-10-CM

## 2025-06-26 DIAGNOSIS — C92.11 CML IN REMISSION (HCC): ICD-10-CM

## 2025-06-26 DIAGNOSIS — C67.9 MALIGNANT NEOPLASM OF URINARY BLADDER, UNSPECIFIED SITE (HCC): Primary | ICD-10-CM

## 2025-06-26 PROCEDURE — 1126F AMNT PAIN NOTED NONE PRSNT: CPT | Performed by: INTERNAL MEDICINE

## 2025-06-26 PROCEDURE — 3075F SYST BP GE 130 - 139MM HG: CPT | Performed by: INTERNAL MEDICINE

## 2025-06-26 PROCEDURE — 99205 OFFICE O/P NEW HI 60 MIN: CPT | Performed by: INTERNAL MEDICINE

## 2025-06-26 PROCEDURE — 1036F TOBACCO NON-USER: CPT | Performed by: INTERNAL MEDICINE

## 2025-06-26 PROCEDURE — G8428 CUR MEDS NOT DOCUMENT: HCPCS | Performed by: INTERNAL MEDICINE

## 2025-06-26 PROCEDURE — G8420 CALC BMI NORM PARAMETERS: HCPCS | Performed by: INTERNAL MEDICINE

## 2025-06-26 PROCEDURE — 3078F DIAST BP <80 MM HG: CPT | Performed by: INTERNAL MEDICINE

## 2025-06-26 PROCEDURE — 1123F ACP DISCUSS/DSCN MKR DOCD: CPT | Performed by: INTERNAL MEDICINE

## 2025-06-26 ASSESSMENT — PATIENT HEALTH QUESTIONNAIRE - PHQ9
1. LITTLE INTEREST OR PLEASURE IN DOING THINGS: NOT AT ALL
SUM OF ALL RESPONSES TO PHQ QUESTIONS 1-9: 0
2. FEELING DOWN, DEPRESSED OR HOPELESS: NOT AT ALL
SUM OF ALL RESPONSES TO PHQ QUESTIONS 1-9: 0

## 2025-06-26 NOTE — PROGRESS NOTES
Faizan Contreras . is a 79 y.o. male    Chief Complaint   Patient presents with    New Patient     NMIBC.       1. Have you been to the ER, urgent care clinic since your last visit?  Hospitalized since your last visit? Yes, [facility]     2. Have you seen or consulted any other health care providers outside of the [facility] since your last visit?  Include any pap smears or colon screening. Yes, [facility]

## 2025-06-26 NOTE — PROGRESS NOTES
NCCN Distress Thermometer    Medical Oncology at [facility]    Date Screening Completed: 6/26/2025    Screening Declined:  [] Yes    Number that best describes how much distress you've experienced in the past week, including today?  0 [x] - No distress 1 []      2 []      3 []      4 []       5 []       6 []      7 []      8 []      9 []       10 [] - Extreme distress    PROBLEM LIST  Have you had concerns about any of the items below in the past week, including today?      Physical Concerns Practical Concerns   [] Pain [] Taking care of myself    [x] Sleep [] Taking care of others    [] Fatigue [] Safety   [] Tobacco use  [] Work   [] Substance use  [] School   [] Memory or concentration [] Housing/Utilities   [] Sexual health [] Finances   [] Changes in eating  [] Insurance   [] Loss or change of physical abilities  [] Transportation    []    Emotional Concerns [] Having enough food   [] Worry or anxiety [] Access to medicine   [] Sadness or depression [] Treatment decisions   [] Loss of interest or enjoyment     [] Grief or loss  Spiritual or Catholic Concerns   [] Fear [] Sense of meaning or purpose   [] Loneliness  [] Changes in mohit or beliefs   [] Anger [] Death, dying, or afterlife   [] Changes in appearance [] Conflict between beliefs and cancer treatments    [] Feelings of worthlessness or being a burden [] Relationship with the sacred    [] Ritual or dietary needs    Social Concerns     [] Relationship with spouse or partner     [x] Relationship with children    [] Relationship with family members     [] Relationship with friends or coworkers     [] Communication with health care team     [] Ability to have children     [] Prejudice or discrimination        Other Concerns: n/a

## 2025-07-18 ENCOUNTER — HOSPITAL ENCOUNTER (OUTPATIENT)
Facility: HOSPITAL | Age: 80
Discharge: HOME | End: 2025-07-21
Payer: MEDICARE

## 2025-07-18 VITALS
WEIGHT: 147 LBS | SYSTOLIC BLOOD PRESSURE: 145 MMHG | DIASTOLIC BLOOD PRESSURE: 75 MMHG | BODY MASS INDEX: 23.63 KG/M2 | HEIGHT: 66 IN | HEART RATE: 61 BPM | TEMPERATURE: 98.2 F

## 2025-07-18 LAB
ALBUMIN SERPL-MCNC: 3.4 G/DL (ref 3.5–5)
ALBUMIN/GLOB SERPL: 1.2 (ref 1.1–2.2)
ALP SERPL-CCNC: 59 U/L (ref 45–117)
ALT SERPL-CCNC: 28 U/L (ref 12–78)
ANION GAP SERPL CALC-SCNC: 7 MMOL/L (ref 2–12)
AST SERPL-CCNC: 35 U/L (ref 15–37)
BASOPHILS # BLD: 0.05 K/UL (ref 0–0.1)
BASOPHILS NFR BLD: 1 % (ref 0–1)
BILIRUB SERPL-MCNC: 0.5 MG/DL (ref 0.2–1)
BUN SERPL-MCNC: 21 MG/DL (ref 6–20)
BUN/CREAT SERPL: 12 (ref 12–20)
CALCIUM SERPL-MCNC: 8.8 MG/DL (ref 8.5–10.1)
CHLORIDE SERPL-SCNC: 105 MMOL/L (ref 97–108)
CO2 SERPL-SCNC: 26 MMOL/L (ref 21–32)
CREAT SERPL-MCNC: 1.78 MG/DL (ref 0.7–1.3)
DIFFERENTIAL METHOD BLD: ABNORMAL
EKG ATRIAL RATE: 54 BPM
EKG DIAGNOSIS: NORMAL
EKG P AXIS: 52 DEGREES
EKG P-R INTERVAL: 138 MS
EKG Q-T INTERVAL: 466 MS
EKG QRS DURATION: 92 MS
EKG QTC CALCULATION (BAZETT): 441 MS
EKG R AXIS: 3 DEGREES
EKG T AXIS: 17 DEGREES
EKG VENTRICULAR RATE: 54 BPM
EOSINOPHIL # BLD: 0.24 K/UL (ref 0–0.4)
EOSINOPHIL NFR BLD: 4.7 % (ref 0–7)
ERYTHROCYTE [DISTWIDTH] IN BLOOD BY AUTOMATED COUNT: 15.2 % (ref 11.5–14.5)
GLOBULIN SER CALC-MCNC: 2.9 G/DL (ref 2–4)
GLUCOSE SERPL-MCNC: 106 MG/DL (ref 65–100)
HCT VFR BLD AUTO: 33.6 % (ref 36.6–50.3)
HGB BLD-MCNC: 11 G/DL (ref 12.1–17)
IMM GRANULOCYTES # BLD AUTO: 0.02 K/UL (ref 0–0.04)
IMM GRANULOCYTES NFR BLD AUTO: 0.4 % (ref 0–0.5)
LYMPHOCYTES # BLD: 0.93 K/UL (ref 0.8–3.5)
LYMPHOCYTES NFR BLD: 18.1 % (ref 12–49)
MCH RBC QN AUTO: 31.7 PG (ref 26–34)
MCHC RBC AUTO-ENTMCNC: 32.7 G/DL (ref 30–36.5)
MCV RBC AUTO: 96.8 FL (ref 80–99)
MONOCYTES # BLD: 0.72 K/UL (ref 0–1)
MONOCYTES NFR BLD: 14 % (ref 5–13)
NEUTS SEG # BLD: 3.19 K/UL (ref 1.8–8)
NEUTS SEG NFR BLD: 61.8 % (ref 32–75)
NRBC # BLD: 0 K/UL (ref 0–0.01)
NRBC BLD-RTO: 0 PER 100 WBC
PLATELET # BLD AUTO: 209 K/UL (ref 150–400)
PMV BLD AUTO: 12 FL (ref 8.9–12.9)
POTASSIUM SERPL-SCNC: 4.5 MMOL/L (ref 3.5–5.1)
PROT SERPL-MCNC: 6.3 G/DL (ref 6.4–8.2)
RBC # BLD AUTO: 3.47 M/UL (ref 4.1–5.7)
SODIUM SERPL-SCNC: 138 MMOL/L (ref 136–145)
WBC # BLD AUTO: 5.2 K/UL (ref 4.1–11.1)

## 2025-07-18 PROCEDURE — 80053 COMPREHEN METABOLIC PANEL: CPT

## 2025-07-18 PROCEDURE — 85025 COMPLETE CBC W/AUTO DIFF WBC: CPT

## 2025-07-18 PROCEDURE — 93005 ELECTROCARDIOGRAM TRACING: CPT

## 2025-07-18 PROCEDURE — 81001 URINALYSIS AUTO W/SCOPE: CPT

## 2025-07-18 RX ORDER — CALCIUM CARBONATE 500 MG/1
1 TABLET, CHEWABLE ORAL DAILY
COMMUNITY

## 2025-07-18 ASSESSMENT — PAIN SCALES - GENERAL: PAINLEVEL_OUTOF10: 2

## 2025-07-18 ASSESSMENT — PAIN DESCRIPTION - ORIENTATION: ORIENTATION: LEFT

## 2025-07-18 NOTE — PERIOP NOTE
CALLED S , DR. SHO AYOUB OFFICE, [phone] REQUESTING LAST EKG AND OV NOTE TO BE FAXED TO [facility]    CALLED [facility]   FOR LAST OV NOTE TO BE FAXED TO [facility]    PATIENT CAME IN FOR PREOP APPT AND JUST CAME BACK FROM A TRIP TO [facility] WHERE HE STATES HE DID A LOT OF WALKING AND NOW HAS A BLISTER TO HIS SECOND TOE (INSIDE BETWEEN BIG TOE AND SECOND TOE) ON HIS LEFT FOOT. I HAD COLLEEN RANDLE NP TAKE A LOOK AT IT TO MAKE SURE IT IS NOT INFECTED. COLLEEN INSTRUCTED THE PATIENT TO KEEP IT DRY TILL IT HEALS AND STATES IT DOESN'T LOOK INFECTED AT THIS TIME. PATIENT INSTRUCTED TO SEE PROVIDER IF IT DOESN'T HEAL OR GETS WORSE BEFORE SURGERY AND TO LET SURGEONS OFFICE KNOW.

## 2025-07-18 NOTE — PERIOP NOTE
[address] [address]      MAIN PRE OP            [phone]                                                                                AMBULATORY PRE OP          [phone]    PRE-ADMISSION TESTING    [phone]     Surgery Date:  8/7/25        [facility] staff will call you between 4 and 7pm the day before your surgery with your arrival time.   (If your surgery is on a Monday, we will call you the Friday before.)    Call [phone] after 7pm Monday-Friday if you did not receive this call.    INSTRUCTIONS BEFORE YOUR SURGERY   When You  Arrive Arrive at [facility] Patient Access on 1st floor the day of your surgery.    Have your insurance card, photo ID,living will/advanced directive/POA (if applicable),  and any copayment (if needed)   Food   and   Drink NO solid food after midnight the night before surgery. You can drink clear liquids from midnight until ONE hour prior to your arrival at the hospital on the day of your surgery.     Clear liquids include:  Water  Apple juice (no sediment)  Carbonated beverages  Black coffee(no cream/milk)  Tea(no cream/milk)  Gatorade    No alcohol (beer, wine, liquor) or marijuana (smoking) 24 hours prior to surgery.   No marijuana edibles for 3 days prior to surgery.    Stop smoking cigarettes 14 days before surgery (helps w/healing and breathing).   Medications to   TAKE   Morning of Surgery MEDICATIONS TO TAKE THE MORNING OF SURGERY: AMLODIPINE (NORVASC) AND METOPROLOL (LOPRESSOR) AND PANTOPRAZOLE (PROTONIX)       HOLD CONCERTA DAY OF SURGERY.      HOLD CIALIS 5 DAYS PRIOR TO SURGERY.      HOLD ALL VITAMINS AND HERBALS INCLUDING TUMS WITH MELATONIN 7 DAYS PRIOR TO SURGERY.       DONT TAKE GLEEVEC MORNING OF SURGERY IF YOU HAVE TO TAKE IT WITH FOOD SINCE NO SOLID FOOD AFTER MIDNIGHT THE NIGHT BEFORE SURGERY,  YOU MAY TAKE IT AFTER SURGERY.         You may take these medications, IF NEEDED, the  infections.  Keep your bandage clean and dry!  Do not touch your surgical wound.  Use clean, freshly washed towels and washcloths every time you shower; do not share bath linens with others.  Until your surgical wound is healed, wear clothing and sleep on bed linens that are clean.  Do not allow pets to sleep in your bed with you or touch your surgical wound.  Do not smoke - smoking delays wound healing. This may be a good time to stop smoking.  If you have diabetes, it is important for you to manage your blood sugar levels properly before your surgery as well as after your surgery. Poorly managed blood sugar levels slow down wound healing and prevent you from healing completely.     Follow all instructions so your surgery won’t be cancelled.  Please, be on time.                    If a situation occurs and you are delayed the day of surgery, call [phone]/ [phone].    If your physical condition changes (like a fever, cold, flu, etc.) call your surgeon as soon as possible.    The patient was contacted in person.     He verbalized understanding of all instructions does not need reinforcement.

## 2025-07-19 LAB
APPEARANCE UR: CLEAR
BACTERIA URNS QL MICRO: NEGATIVE /HPF
BILIRUB UR QL: NEGATIVE
COLOR UR: ABNORMAL
EPITH CASTS URNS QL MICRO: ABNORMAL /LPF
GLUCOSE UR STRIP.AUTO-MCNC: NEGATIVE MG/DL
HGB UR QL STRIP: ABNORMAL
HYALINE CASTS URNS QL MICRO: ABNORMAL /LPF (ref 0–2)
KETONES UR QL STRIP.AUTO: NEGATIVE MG/DL
LEUKOCYTE ESTERASE UR QL STRIP.AUTO: ABNORMAL
NITRITE UR QL STRIP.AUTO: NEGATIVE
PH UR STRIP: 7.5 (ref 5–8)
PROT UR STRIP-MCNC: NEGATIVE MG/DL
RBC #/AREA URNS HPF: >100 /HPF (ref 0–5)
SP GR UR REFRACTOMETRY: 1.01
URINE CULTURE IF INDICATED: ABNORMAL
UROBILINOGEN UR QL STRIP.AUTO: 0.2 EU/DL (ref 0.2–1)
WBC URNS QL MICRO: ABNORMAL /HPF (ref 0–4)

## 2025-07-21 NOTE — PERIOP NOTE
CALLED DR GONZALEZ'S OFFICE TO NOTIFY OF ABNORMAL LAB OF CREATININE 1.78. ALSO FAXED ABNORMAL LAB RESULT TO DR GONZALEZ'S OFFICE WITH CONFIRMATION RECEIVED.  STATED SHE SENT A MESSAGE TO DR GONZALEZ'S NURSE. ABNORMAL LAB ALSO FAXED TO PCP VIA CC FAX.

## 2025-08-06 ENCOUNTER — ANESTHESIA EVENT (OUTPATIENT)
Facility: HOSPITAL | Age: 80
DRG: 658 | End: 2025-08-06
Payer: MEDICARE

## 2025-08-07 ENCOUNTER — ANESTHESIA (OUTPATIENT)
Facility: HOSPITAL | Age: 80
DRG: 658 | End: 2025-08-07
Payer: MEDICARE

## 2025-08-07 ENCOUNTER — HOSPITAL ENCOUNTER (INPATIENT)
Facility: HOSPITAL | Age: 80
LOS: 1 days | Discharge: HOME | DRG: 658 | End: 2025-08-08
Attending: UROLOGY | Admitting: UROLOGY
Payer: MEDICARE

## 2025-08-07 PROBLEM — C67.9 MALIGNANT NEOPLASM OF URINARY BLADDER, UNSPECIFIED SITE (HCC): Status: ACTIVE | Noted: 2025-08-07

## 2025-08-07 LAB
ANION GAP SERPL CALC-SCNC: 9 MMOL/L (ref 2–14)
BUN SERPL-MCNC: 16 MG/DL (ref 8–23)
BUN/CREAT SERPL: 10 (ref 12–20)
CALCIUM SERPL-MCNC: 8.6 MG/DL (ref 8.8–10.2)
CHLORIDE SERPL-SCNC: 102 MMOL/L (ref 98–107)
CO2 SERPL-SCNC: 23 MMOL/L (ref 20–29)
CREAT SERPL-MCNC: 1.52 MG/DL (ref 0.7–1.2)
GLUCOSE SERPL-MCNC: 131 MG/DL (ref 65–100)
HCT VFR BLD AUTO: 30.6 % (ref 36.6–50.3)
HGB BLD-MCNC: 10.7 G/DL (ref 12.1–17)
POTASSIUM SERPL-SCNC: 3.7 MMOL/L (ref 3.5–5.1)
SODIUM SERPL-SCNC: 133 MMOL/L (ref 136–145)

## 2025-08-07 PROCEDURE — 80048 BASIC METABOLIC PNL TOTAL CA: CPT

## 2025-08-07 PROCEDURE — 2580000003 HC RX 258: Performed by: UROLOGY

## 2025-08-07 PROCEDURE — 88307 TISSUE EXAM BY PATHOLOGIST: CPT

## 2025-08-07 PROCEDURE — 2580000003 HC RX 258: Performed by: ANESTHESIOLOGY

## 2025-08-07 PROCEDURE — 6360000002 HC RX W HCPCS: Performed by: NURSE ANESTHETIST, CERTIFIED REGISTERED

## 2025-08-07 PROCEDURE — 2709999900 HC NON-CHARGEABLE SUPPLY: Performed by: UROLOGY

## 2025-08-07 PROCEDURE — 85014 HEMATOCRIT: CPT

## 2025-08-07 PROCEDURE — 0TB78ZZ EXCISION OF LEFT URETER, VIA NATURAL OR ARTIFICIAL OPENING ENDOSCOPIC: ICD-10-PCS | Performed by: UROLOGY

## 2025-08-07 PROCEDURE — 7100000000 HC PACU RECOVERY - FIRST 15 MIN: Performed by: UROLOGY

## 2025-08-07 PROCEDURE — 2500000003 HC RX 250 WO HCPCS: Performed by: UROLOGY

## 2025-08-07 PROCEDURE — 1200000000 HC SEMI PRIVATE

## 2025-08-07 PROCEDURE — 85018 HEMOGLOBIN: CPT

## 2025-08-07 PROCEDURE — 0T5D8ZZ DESTRUCTION OF URETHRA, VIA NATURAL OR ARTIFICIAL OPENING ENDOSCOPIC: ICD-10-PCS | Performed by: UROLOGY

## 2025-08-07 PROCEDURE — 0TB68ZZ EXCISION OF RIGHT URETER, VIA NATURAL OR ARTIFICIAL OPENING ENDOSCOPIC: ICD-10-PCS | Performed by: UROLOGY

## 2025-08-07 PROCEDURE — 3600000014 HC SURGERY LEVEL 4 ADDTL 15MIN: Performed by: UROLOGY

## 2025-08-07 PROCEDURE — 3700000000 HC ANESTHESIA ATTENDED CARE: Performed by: UROLOGY

## 2025-08-07 PROCEDURE — 6360000002 HC RX W HCPCS: Performed by: UROLOGY

## 2025-08-07 PROCEDURE — 6360000002 HC RX W HCPCS: Performed by: ANESTHESIOLOGY

## 2025-08-07 PROCEDURE — 3700000001 HC ADD 15 MINUTES (ANESTHESIA): Performed by: UROLOGY

## 2025-08-07 PROCEDURE — 7100000001 HC PACU RECOVERY - ADDTL 15 MIN: Performed by: UROLOGY

## 2025-08-07 PROCEDURE — 2500000003 HC RX 250 WO HCPCS: Performed by: NURSE ANESTHETIST, CERTIFIED REGISTERED

## 2025-08-07 PROCEDURE — 6370000000 HC RX 637 (ALT 250 FOR IP): Performed by: UROLOGY

## 2025-08-07 PROCEDURE — 0TBB8ZZ EXCISION OF BLADDER, VIA NATURAL OR ARTIFICIAL OPENING ENDOSCOPIC: ICD-10-PCS | Performed by: UROLOGY

## 2025-08-07 PROCEDURE — 3600000004 HC SURGERY LEVEL 4 BASE: Performed by: UROLOGY

## 2025-08-07 PROCEDURE — 2580000003 HC RX 258: Performed by: NURSE ANESTHETIST, CERTIFIED REGISTERED

## 2025-08-07 RX ORDER — LIDOCAINE HYDROCHLORIDE 20 MG/ML
INJECTION, SOLUTION EPIDURAL; INFILTRATION; INTRACAUDAL; PERINEURAL
Status: DISCONTINUED | OUTPATIENT
Start: 2025-08-07 | End: 2025-08-07 | Stop reason: SDUPTHER

## 2025-08-07 RX ORDER — SODIUM CHLORIDE, SODIUM LACTATE, POTASSIUM CHLORIDE, CALCIUM CHLORIDE 600; 310; 30; 20 MG/100ML; MG/100ML; MG/100ML; MG/100ML
INJECTION, SOLUTION INTRAVENOUS CONTINUOUS
Status: DISCONTINUED | OUTPATIENT
Start: 2025-08-07 | End: 2025-08-08 | Stop reason: HOSPADM

## 2025-08-07 RX ORDER — PHENYLEPHRINE HCL IN 0.9% NACL 0.4MG/10ML
SYRINGE (ML) INTRAVENOUS
Status: DISCONTINUED | OUTPATIENT
Start: 2025-08-07 | End: 2025-08-07 | Stop reason: SDUPTHER

## 2025-08-07 RX ORDER — PROCHLORPERAZINE EDISYLATE 5 MG/ML
5 INJECTION INTRAMUSCULAR; INTRAVENOUS
Status: DISCONTINUED | OUTPATIENT
Start: 2025-08-07 | End: 2025-08-07 | Stop reason: HOSPADM

## 2025-08-07 RX ORDER — SODIUM CHLORIDE 9 MG/ML
INJECTION, SOLUTION INTRAVENOUS PRN
Status: DISCONTINUED | OUTPATIENT
Start: 2025-08-07 | End: 2025-08-08 | Stop reason: HOSPADM

## 2025-08-07 RX ORDER — OXYCODONE HYDROCHLORIDE 5 MG/1
5 TABLET ORAL
Status: DISCONTINUED | OUTPATIENT
Start: 2025-08-07 | End: 2025-08-07 | Stop reason: HOSPADM

## 2025-08-07 RX ORDER — SODIUM CHLORIDE 9 MG/ML
INJECTION, SOLUTION INTRAVENOUS PRN
Status: DISCONTINUED | OUTPATIENT
Start: 2025-08-07 | End: 2025-08-07 | Stop reason: HOSPADM

## 2025-08-07 RX ORDER — TRAMADOL HYDROCHLORIDE 50 MG/1
50 TABLET ORAL EVERY 6 HOURS PRN
Status: DISCONTINUED | OUTPATIENT
Start: 2025-08-07 | End: 2025-08-08 | Stop reason: HOSPADM

## 2025-08-07 RX ORDER — EPHEDRINE SULFATE 50 MG/ML
INJECTION INTRAVENOUS
Status: DISCONTINUED | OUTPATIENT
Start: 2025-08-07 | End: 2025-08-07 | Stop reason: SDUPTHER

## 2025-08-07 RX ORDER — TRAMADOL HYDROCHLORIDE 50 MG/1
100 TABLET ORAL EVERY 6 HOURS PRN
Status: DISCONTINUED | OUTPATIENT
Start: 2025-08-07 | End: 2025-08-08 | Stop reason: HOSPADM

## 2025-08-07 RX ORDER — ONDANSETRON 2 MG/ML
INJECTION INTRAMUSCULAR; INTRAVENOUS
Status: DISCONTINUED | OUTPATIENT
Start: 2025-08-07 | End: 2025-08-07 | Stop reason: SDUPTHER

## 2025-08-07 RX ORDER — ONDANSETRON 2 MG/ML
4 INJECTION INTRAMUSCULAR; INTRAVENOUS
Status: DISCONTINUED | OUTPATIENT
Start: 2025-08-07 | End: 2025-08-07 | Stop reason: HOSPADM

## 2025-08-07 RX ORDER — FENTANYL CITRATE 50 UG/ML
INJECTION, SOLUTION INTRAMUSCULAR; INTRAVENOUS
Status: DISCONTINUED | OUTPATIENT
Start: 2025-08-07 | End: 2025-08-07 | Stop reason: SDUPTHER

## 2025-08-07 RX ORDER — PROPOFOL 10 MG/ML
INJECTION, EMULSION INTRAVENOUS
Status: DISCONTINUED | OUTPATIENT
Start: 2025-08-07 | End: 2025-08-07 | Stop reason: SDUPTHER

## 2025-08-07 RX ORDER — MIDAZOLAM HYDROCHLORIDE 1 MG/ML
INJECTION, SOLUTION INTRAMUSCULAR; INTRAVENOUS
Status: DISCONTINUED | OUTPATIENT
Start: 2025-08-07 | End: 2025-08-07 | Stop reason: SDUPTHER

## 2025-08-07 RX ORDER — LIDOCAINE HYDROCHLORIDE 10 MG/ML
1 INJECTION, SOLUTION EPIDURAL; INFILTRATION; INTRACAUDAL; PERINEURAL
Status: DISCONTINUED | OUTPATIENT
Start: 2025-08-07 | End: 2025-08-07 | Stop reason: HOSPADM

## 2025-08-07 RX ORDER — FENTANYL CITRATE 50 UG/ML
100 INJECTION, SOLUTION INTRAMUSCULAR; INTRAVENOUS
Status: DISCONTINUED | OUTPATIENT
Start: 2025-08-07 | End: 2025-08-07 | Stop reason: HOSPADM

## 2025-08-07 RX ORDER — SODIUM CHLORIDE 0.9 % (FLUSH) 0.9 %
5-40 SYRINGE (ML) INJECTION EVERY 12 HOURS SCHEDULED
Status: DISCONTINUED | OUTPATIENT
Start: 2025-08-07 | End: 2025-08-07 | Stop reason: HOSPADM

## 2025-08-07 RX ORDER — IMATINIB MESYLATE 100 MG/1
400 TABLET, FILM COATED ORAL DAILY
Status: DISCONTINUED | OUTPATIENT
Start: 2025-08-07 | End: 2025-08-08 | Stop reason: HOSPADM

## 2025-08-07 RX ORDER — HYDROMORPHONE HYDROCHLORIDE 1 MG/ML
0.5 INJECTION, SOLUTION INTRAMUSCULAR; INTRAVENOUS; SUBCUTANEOUS EVERY 5 MIN PRN
Status: DISCONTINUED | OUTPATIENT
Start: 2025-08-07 | End: 2025-08-07 | Stop reason: HOSPADM

## 2025-08-07 RX ORDER — SODIUM CHLORIDE 0.9 % (FLUSH) 0.9 %
5-40 SYRINGE (ML) INJECTION PRN
Status: DISCONTINUED | OUTPATIENT
Start: 2025-08-07 | End: 2025-08-07 | Stop reason: HOSPADM

## 2025-08-07 RX ORDER — AMLODIPINE BESYLATE 5 MG/1
5 TABLET ORAL DAILY
Status: DISCONTINUED | OUTPATIENT
Start: 2025-08-08 | End: 2025-08-08 | Stop reason: HOSPADM

## 2025-08-07 RX ORDER — PANTOPRAZOLE SODIUM 40 MG/1
40 TABLET, DELAYED RELEASE ORAL
Status: DISCONTINUED | OUTPATIENT
Start: 2025-08-07 | End: 2025-08-08 | Stop reason: HOSPADM

## 2025-08-07 RX ORDER — ONDANSETRON 4 MG/1
4 TABLET, ORALLY DISINTEGRATING ORAL EVERY 8 HOURS PRN
Status: DISCONTINUED | OUTPATIENT
Start: 2025-08-07 | End: 2025-08-08 | Stop reason: HOSPADM

## 2025-08-07 RX ORDER — ZOLPIDEM TARTRATE 5 MG/1
10 TABLET ORAL NIGHTLY PRN
Status: DISCONTINUED | OUTPATIENT
Start: 2025-08-07 | End: 2025-08-08 | Stop reason: HOSPADM

## 2025-08-07 RX ORDER — HYDRALAZINE HYDROCHLORIDE 20 MG/ML
10 INJECTION INTRAMUSCULAR; INTRAVENOUS
Status: DISCONTINUED | OUTPATIENT
Start: 2025-08-07 | End: 2025-08-07 | Stop reason: HOSPADM

## 2025-08-07 RX ORDER — FENTANYL CITRATE 50 UG/ML
25 INJECTION, SOLUTION INTRAMUSCULAR; INTRAVENOUS EVERY 5 MIN PRN
Status: DISCONTINUED | OUTPATIENT
Start: 2025-08-07 | End: 2025-08-07 | Stop reason: HOSPADM

## 2025-08-07 RX ORDER — SODIUM CHLORIDE, SODIUM LACTATE, POTASSIUM CHLORIDE, CALCIUM CHLORIDE 600; 310; 30; 20 MG/100ML; MG/100ML; MG/100ML; MG/100ML
INJECTION, SOLUTION INTRAVENOUS CONTINUOUS
Status: DISCONTINUED | OUTPATIENT
Start: 2025-08-07 | End: 2025-08-07 | Stop reason: HOSPADM

## 2025-08-07 RX ORDER — SODIUM CHLORIDE 0.9 % (FLUSH) 0.9 %
5-40 SYRINGE (ML) INJECTION PRN
Status: DISCONTINUED | OUTPATIENT
Start: 2025-08-07 | End: 2025-08-08 | Stop reason: HOSPADM

## 2025-08-07 RX ORDER — METHYLPHENIDATE HYDROCHLORIDE 5 MG/1
10 TABLET ORAL 2 TIMES DAILY WITH MEALS
Status: DISCONTINUED | OUTPATIENT
Start: 2025-08-07 | End: 2025-08-08 | Stop reason: HOSPADM

## 2025-08-07 RX ORDER — METOPROLOL TARTRATE 25 MG/1
25 TABLET, FILM COATED ORAL 2 TIMES DAILY
Status: DISCONTINUED | OUTPATIENT
Start: 2025-08-07 | End: 2025-08-08 | Stop reason: HOSPADM

## 2025-08-07 RX ORDER — LATANOPROST 50 UG/ML
1 SOLUTION/ DROPS OPHTHALMIC DAILY
Status: DISCONTINUED | OUTPATIENT
Start: 2025-08-07 | End: 2025-08-08 | Stop reason: HOSPADM

## 2025-08-07 RX ORDER — ONDANSETRON 2 MG/ML
4 INJECTION INTRAMUSCULAR; INTRAVENOUS EVERY 6 HOURS PRN
Status: DISCONTINUED | OUTPATIENT
Start: 2025-08-07 | End: 2025-08-08 | Stop reason: HOSPADM

## 2025-08-07 RX ORDER — LATANOPROST 50 UG/ML
SOLUTION/ DROPS OPHTHALMIC
COMMUNITY

## 2025-08-07 RX ORDER — DEXAMETHASONE SODIUM PHOSPHATE 4 MG/ML
INJECTION, SOLUTION INTRA-ARTICULAR; INTRALESIONAL; INTRAMUSCULAR; INTRAVENOUS; SOFT TISSUE
Status: DISCONTINUED | OUTPATIENT
Start: 2025-08-07 | End: 2025-08-07 | Stop reason: SDUPTHER

## 2025-08-07 RX ORDER — SODIUM CHLORIDE 0.9 % (FLUSH) 0.9 %
5-40 SYRINGE (ML) INJECTION EVERY 12 HOURS SCHEDULED
Status: DISCONTINUED | OUTPATIENT
Start: 2025-08-07 | End: 2025-08-08 | Stop reason: HOSPADM

## 2025-08-07 RX ORDER — MIDAZOLAM HYDROCHLORIDE 2 MG/2ML
2 INJECTION, SOLUTION INTRAMUSCULAR; INTRAVENOUS PRN
Status: DISCONTINUED | OUTPATIENT
Start: 2025-08-07 | End: 2025-08-07 | Stop reason: HOSPADM

## 2025-08-07 RX ADMIN — EPHEDRINE SULFATE 5 MG: 50 INJECTION INTRAVENOUS at 08:02

## 2025-08-07 RX ADMIN — EPHEDRINE SULFATE 5 MG: 50 INJECTION INTRAVENOUS at 07:49

## 2025-08-07 RX ADMIN — SODIUM CHLORIDE, SODIUM LACTATE, POTASSIUM CHLORIDE, AND CALCIUM CHLORIDE: .6; .31; .03; .02 INJECTION, SOLUTION INTRAVENOUS at 09:31

## 2025-08-07 RX ADMIN — TRAMADOL HYDROCHLORIDE 50 MG: 50 TABLET, COATED ORAL at 22:37

## 2025-08-07 RX ADMIN — MIDAZOLAM 1 MG: 1 INJECTION INTRAMUSCULAR; INTRAVENOUS at 07:29

## 2025-08-07 RX ADMIN — FENTANYL CITRATE 25 MCG: 50 INJECTION INTRAMUSCULAR; INTRAVENOUS at 07:51

## 2025-08-07 RX ADMIN — Medication 40 MCG: at 08:07

## 2025-08-07 RX ADMIN — ONDANSETRON 4 MG: 2 INJECTION, SOLUTION INTRAMUSCULAR; INTRAVENOUS at 08:29

## 2025-08-07 RX ADMIN — FENTANYL CITRATE 25 MCG: 50 INJECTION, SOLUTION INTRAMUSCULAR; INTRAVENOUS at 09:02

## 2025-08-07 RX ADMIN — SODIUM CHLORIDE, POTASSIUM CHLORIDE, SODIUM LACTATE AND CALCIUM CHLORIDE: 600; 310; 30; 20 INJECTION, SOLUTION INTRAVENOUS at 07:10

## 2025-08-07 RX ADMIN — WATER 2000 MG: 1 INJECTION INTRAMUSCULAR; INTRAVENOUS; SUBCUTANEOUS at 07:51

## 2025-08-07 RX ADMIN — EPHEDRINE SULFATE 5 MG: 50 INJECTION INTRAVENOUS at 08:05

## 2025-08-07 RX ADMIN — SODIUM CHLORIDE, SODIUM LACTATE, POTASSIUM CHLORIDE, AND CALCIUM CHLORIDE: .6; .31; .03; .02 INJECTION, SOLUTION INTRAVENOUS at 22:26

## 2025-08-07 RX ADMIN — PROPOFOL 120 MG: 10 INJECTION, EMULSION INTRAVENOUS at 07:43

## 2025-08-07 RX ADMIN — DEXAMETHASONE SODIUM PHOSPHATE 4 MG: 4 INJECTION, SOLUTION INTRAMUSCULAR; INTRAVENOUS at 07:52

## 2025-08-07 RX ADMIN — EPHEDRINE SULFATE 5 MG: 50 INJECTION INTRAVENOUS at 07:57

## 2025-08-07 RX ADMIN — ZOLPIDEM TARTRATE 10 MG: 5 TABLET ORAL at 22:35

## 2025-08-07 RX ADMIN — METOPROLOL TARTRATE 25 MG: 25 TABLET, FILM COATED ORAL at 22:23

## 2025-08-07 RX ADMIN — FENTANYL CITRATE 75 MCG: 50 INJECTION INTRAMUSCULAR; INTRAVENOUS at 08:37

## 2025-08-07 RX ADMIN — LIDOCAINE HYDROCHLORIDE 50 MG: 20 INJECTION, SOLUTION EPIDURAL; INFILTRATION; INTRACAUDAL; PERINEURAL at 07:43

## 2025-08-07 RX ADMIN — PHENYLEPHRINE HYDROCHLORIDE 30 MCG/MIN: 10 INJECTION INTRAVENOUS at 08:07

## 2025-08-07 ASSESSMENT — PAIN - FUNCTIONAL ASSESSMENT
PAIN_FUNCTIONAL_ASSESSMENT: 0-10
PAIN_FUNCTIONAL_ASSESSMENT: NONE - DENIES PAIN
PAIN_FUNCTIONAL_ASSESSMENT: 0-10

## 2025-08-07 ASSESSMENT — PAIN DESCRIPTION - LOCATION: LOCATION: PENIS

## 2025-08-07 ASSESSMENT — PAIN SCALES - GENERAL
PAINLEVEL_OUTOF10: 5
PAINLEVEL_OUTOF10: 2
PAINLEVEL_OUTOF10: 0
PAINLEVEL_OUTOF10: 3

## 2025-08-08 VITALS
WEIGHT: 147 LBS | HEART RATE: 67 BPM | RESPIRATION RATE: 20 BRPM | TEMPERATURE: 97.7 F | HEIGHT: 66 IN | BODY MASS INDEX: 23.63 KG/M2 | DIASTOLIC BLOOD PRESSURE: 74 MMHG | OXYGEN SATURATION: 92 % | SYSTOLIC BLOOD PRESSURE: 136 MMHG

## 2025-08-08 LAB
ANION GAP SERPL CALC-SCNC: 9 MMOL/L (ref 2–14)
BUN SERPL-MCNC: 13 MG/DL (ref 8–23)
BUN/CREAT SERPL: 10 (ref 12–20)
CALCIUM SERPL-MCNC: 8.7 MG/DL (ref 8.8–10.2)
CHLORIDE SERPL-SCNC: 104 MMOL/L (ref 98–107)
CO2 SERPL-SCNC: 25 MMOL/L (ref 20–29)
CREAT SERPL-MCNC: 1.29 MG/DL (ref 0.7–1.2)
GLUCOSE SERPL-MCNC: 128 MG/DL (ref 65–100)
HCT VFR BLD AUTO: 29.8 % (ref 36.6–50.3)
HGB BLD-MCNC: 10.2 G/DL (ref 12.1–17)
POTASSIUM SERPL-SCNC: 4.7 MMOL/L (ref 3.5–5.1)
SODIUM SERPL-SCNC: 137 MMOL/L (ref 136–145)

## 2025-08-08 PROCEDURE — 85014 HEMATOCRIT: CPT

## 2025-08-08 PROCEDURE — 85018 HEMOGLOBIN: CPT

## 2025-08-08 PROCEDURE — 94760 N-INVAS EAR/PLS OXIMETRY 1: CPT

## 2025-08-08 PROCEDURE — 6370000000 HC RX 637 (ALT 250 FOR IP): Performed by: UROLOGY

## 2025-08-08 PROCEDURE — 80048 BASIC METABOLIC PNL TOTAL CA: CPT

## 2025-08-08 RX ADMIN — METHYLPHENIDATE HYDROCHLORIDE 10 MG: 5 TABLET ORAL at 07:06

## 2025-08-08 RX ADMIN — PANTOPRAZOLE SODIUM 40 MG: 40 TABLET, DELAYED RELEASE ORAL at 07:06

## 2025-08-08 RX ADMIN — AMLODIPINE BESYLATE 5 MG: 5 TABLET ORAL at 10:38

## 2025-08-08 RX ADMIN — LATANOPROST 1 DROP: 50 SOLUTION OPHTHALMIC at 10:39

## 2025-08-08 RX ADMIN — METOPROLOL TARTRATE 25 MG: 25 TABLET, FILM COATED ORAL at 10:38

## 2025-08-08 RX ADMIN — IMATINIB 400 MG: 100 TABLET, FILM COATED ORAL at 10:40

## 2025-08-11 LAB
ANION GAP SERPL CALC-SCNC: 8 MMOL/L (ref 2–14)
BUN SERPL-MCNC: 13 MG/DL (ref 8–23)
BUN/CREAT SERPL: 10 (ref 12–20)
CALCIUM SERPL-MCNC: 8.7 MG/DL (ref 8.8–10.2)
CHLORIDE SERPL-SCNC: 104 MMOL/L (ref 98–107)
CO2 SERPL-SCNC: 25 MMOL/L (ref 20–29)
CREAT SERPL-MCNC: 1.29 MG/DL (ref 0.7–1.2)
GLUCOSE SERPL-MCNC: 128 MG/DL (ref 65–100)
POTASSIUM SERPL-SCNC: 4.7 MMOL/L (ref 3.5–5.1)
SODIUM SERPL-SCNC: 137 MMOL/L (ref 136–145)

## 2025-09-04 ENCOUNTER — CLINICAL DOCUMENTATION (OUTPATIENT)
Age: 80
End: 2025-09-04

## 2025-09-04 ENCOUNTER — OFFICE VISIT (OUTPATIENT)
Age: 80
End: 2025-09-04
Payer: MEDICARE

## 2025-09-04 ENCOUNTER — TELEPHONE (OUTPATIENT)
Age: 80
End: 2025-09-04

## 2025-09-04 VITALS
WEIGHT: 152 LBS | OXYGEN SATURATION: 97 % | DIASTOLIC BLOOD PRESSURE: 74 MMHG | SYSTOLIC BLOOD PRESSURE: 158 MMHG | TEMPERATURE: 98 F | BODY MASS INDEX: 24.53 KG/M2

## 2025-09-04 DIAGNOSIS — C67.9 MALIGNANT NEOPLASM OF URINARY BLADDER, UNSPECIFIED SITE (HCC): Primary | ICD-10-CM

## 2025-09-04 PROCEDURE — G8420 CALC BMI NORM PARAMETERS: HCPCS | Performed by: INTERNAL MEDICINE

## 2025-09-04 PROCEDURE — 1123F ACP DISCUSS/DSCN MKR DOCD: CPT | Performed by: INTERNAL MEDICINE

## 2025-09-04 PROCEDURE — 1111F DSCHRG MED/CURRENT MED MERGE: CPT | Performed by: INTERNAL MEDICINE

## 2025-09-04 PROCEDURE — 1036F TOBACCO NON-USER: CPT | Performed by: INTERNAL MEDICINE

## 2025-09-04 PROCEDURE — 99215 OFFICE O/P EST HI 40 MIN: CPT | Performed by: INTERNAL MEDICINE

## 2025-09-04 PROCEDURE — 3077F SYST BP >= 140 MM HG: CPT | Performed by: INTERNAL MEDICINE

## 2025-09-04 PROCEDURE — 3078F DIAST BP <80 MM HG: CPT | Performed by: INTERNAL MEDICINE

## 2025-09-04 PROCEDURE — G8428 CUR MEDS NOT DOCUMENT: HCPCS | Performed by: INTERNAL MEDICINE

## (undated) DEVICE — QUILTED PREMIUM COMFORT UNDERPAD,EXTRA HEAVY: Brand: WINGS

## (undated) DEVICE — KIT IV STRT W CHLORAPREP PD 1ML

## (undated) DEVICE — AIRLIFE™ U/CONNECT-IT OXYGEN TUBING 7 FEET (2.1 M) CRUSH-RESISTANT OXYGEN TUBING, VINYL TIPPED: Brand: AIRLIFE™

## (undated) DEVICE — SOLIDIFIER FLUID 3000 CC ABSORB

## (undated) DEVICE — PACK,LAPAROTOMY,2 REINFORCED GOWNS: Brand: MEDLINE

## (undated) DEVICE — Z DISCONTINUED USE 2751540 TUBING IRRIG L10IN DISP PMP ENDOGATOR

## (undated) DEVICE — SYRINGE MED 10ML LUERLOCK TIP W/O SFTY DISP

## (undated) DEVICE — GARMENT COMPR M FOR 12-18IN CALF INTMIT SGL BLDR HEMO-FORCE

## (undated) DEVICE — CATHETER URETH 24FR BLLN 5CC LTX 3 W F SPEC SHT RND TIP TWO

## (undated) DEVICE — DRAIN SURG PENROSE 0.5X18 IN CLOSED WND DRAINAGE PREM SIL

## (undated) DEVICE — SUTURE MCRYL SZ 4-0 L27IN ABSRB UD L19MM PS-2 1/2 CIR PRIM Y426H

## (undated) DEVICE — GLOVE ORANGE PI 7 1/2   MSG9075

## (undated) DEVICE — PENCIL SMK EVAC 10 FT BLADE ELECTRD ROCKER FOR TELSCP

## (undated) DEVICE — SUTURE PROL SZ 0 L30IN NONABSORBABLE BLU L26MM CT-2 1/2 CIR 8412H

## (undated) DEVICE — BAG URIN DRNGE ANTI REFLX TWR SLDE TAP 4000ML

## (undated) DEVICE — Z DISCONTINUED NO SUB IDED SET EXTN W/ 4 W STPCOCK M SPIN LOK 36IN

## (undated) DEVICE — Device

## (undated) DEVICE — SUTURE VCRL SZ 3-0 L54IN ABSRB VLT LIGAPAK REEL NDL J205G

## (undated) DEVICE — SET ADMIN 16ML TBNG L100IN 2 Y INJ SITE IV PIGGY BK DISP

## (undated) DEVICE — 1200 GUARD II KIT W/5MM TUBE W/O VAC TUBE: Brand: GUARDIAN

## (undated) DEVICE — WRISTBAND ID AD W1XL11.5IN RED POLY ALRG PREPRINTED PERM

## (undated) DEVICE — SOLUTION GL 1.5% IN H2O 3000ML

## (undated) DEVICE — ELECTRODE PT RET AD L9FT HI MOIST COND ADH HYDRGEL CORDED

## (undated) DEVICE — GLOVE SURG SZ 75 L1212IN FNGR THK138MIL BRN LTX FREE

## (undated) DEVICE — SYRINGE MED 20ML STD CLR PLAS LUERLOCK TIP N CTRL DISP

## (undated) DEVICE — SOLUTION IRRIG 1000ML H2O PIC PLAS SHATTERPROOF CONTAINER

## (undated) DEVICE — Z CONVERTED USE 2274299 CUFF BLD PRESSURE LNG MED AD 25-35 CM ARM FLEXIPORT DISP

## (undated) DEVICE — GARMENT,MEDLINE,DVT,INT,CALF,MED, GEN2: Brand: MEDLINE

## (undated) DEVICE — PREP SKN CHLRAPRP APL 26ML STR --

## (undated) DEVICE — DERMABOND SKIN ADH 0.7ML -- DERMABOND ADVANCED 12/BX

## (undated) DEVICE — BASIN ST MAJOR-NO CAUTERY: Brand: MEDLINE INDUSTRIES, INC.

## (undated) DEVICE — SUTURE VCRL SZ 3-0 L27IN ABSRB UD L26MM SH 1/2 CIR J416H

## (undated) DEVICE — BAG BELONG PT PERS CLEAR HANDL

## (undated) DEVICE — ENDO CARRY-ON PROCEDURE KIT INCLUDES ENZYMATIC SPONGE, GAUZE, BIOHAZARD LABEL, TRAY, LUBRICANT, DIRTY SCOPE LABEL, WATER LABEL, TRAY, DRAWSTRING PAD, AND DEFENDO 4-PIECE KIT.: Brand: ENDO CARRY-ON PROCEDURE KIT

## (undated) DEVICE — SET FLD WRM 308ML IRRIG HEAT EXCHG DBL SPIK SET PT LN W/

## (undated) DEVICE — BW-412T DISP COMBO CLEANING BRUSH: Brand: SINGLE USE COMBINATION CLEANING BRUSH

## (undated) DEVICE — NEEDLE HYPO 18GA L1.5IN PNK S STL HUB POLYPR SHLD REG BVL

## (undated) DEVICE — CONNECTOR TBNG AUX H2O JET DISP FOR OLY 160/180 SER

## (undated) DEVICE — HYPODERMIC SAFETY NEEDLE: Brand: MAGELLAN

## (undated) DEVICE — REM POLYHESIVE ADULT PATIENT RETURN ELECTRODE: Brand: VALLEYLAB

## (undated) DEVICE — CATH IV AUTOGRD BC BLU 22GA 25 -- INSYTE

## (undated) DEVICE — KENDALL RADIOLUCENT FOAM MONITORING ELECTRODE -RECTANGULAR SHAPE: Brand: KENDALL